# Patient Record
Sex: MALE | Race: BLACK OR AFRICAN AMERICAN | NOT HISPANIC OR LATINO
[De-identification: names, ages, dates, MRNs, and addresses within clinical notes are randomized per-mention and may not be internally consistent; named-entity substitution may affect disease eponyms.]

---

## 2017-09-13 PROBLEM — Z00.00 ENCOUNTER FOR PREVENTIVE HEALTH EXAMINATION: Status: ACTIVE | Noted: 2017-09-13

## 2017-10-09 ENCOUNTER — FORM ENCOUNTER (OUTPATIENT)
Age: 67
End: 2017-10-09

## 2017-10-10 ENCOUNTER — OUTPATIENT (OUTPATIENT)
Dept: OUTPATIENT SERVICES | Facility: HOSPITAL | Age: 67
LOS: 1 days | End: 2017-10-10
Payer: MEDICARE

## 2017-10-10 ENCOUNTER — APPOINTMENT (OUTPATIENT)
Dept: ORTHOPEDIC SURGERY | Facility: CLINIC | Age: 67
End: 2017-10-10
Payer: MEDICARE

## 2017-10-10 VITALS — BODY MASS INDEX: 41.72 KG/M2 | HEIGHT: 72 IN | WEIGHT: 308 LBS

## 2017-10-10 DIAGNOSIS — M17.0 BILATERAL PRIMARY OSTEOARTHRITIS OF KNEE: ICD-10-CM

## 2017-10-10 DIAGNOSIS — Z82.49 FAMILY HISTORY OF ISCHEMIC HEART DISEASE AND OTHER DISEASES OF THE CIRCULATORY SYSTEM: ICD-10-CM

## 2017-10-10 DIAGNOSIS — I87.2 VENOUS INSUFFICIENCY (CHRONIC) (PERIPHERAL): ICD-10-CM

## 2017-10-10 DIAGNOSIS — Z87.19 PERSONAL HISTORY OF OTHER DISEASES OF THE DIGESTIVE SYSTEM: ICD-10-CM

## 2017-10-10 DIAGNOSIS — Z87.09 PERSONAL HISTORY OF OTHER DISEASES OF THE RESPIRATORY SYSTEM: ICD-10-CM

## 2017-10-10 DIAGNOSIS — Z86.79 PERSONAL HISTORY OF OTHER DISEASES OF THE CIRCULATORY SYSTEM: ICD-10-CM

## 2017-10-10 DIAGNOSIS — Z87.898 PERSONAL HISTORY OF OTHER SPECIFIED CONDITIONS: ICD-10-CM

## 2017-10-10 DIAGNOSIS — Z86.39 PERSONAL HISTORY OF OTHER ENDOCRINE, NUTRITIONAL AND METABOLIC DISEASE: ICD-10-CM

## 2017-10-10 DIAGNOSIS — Z87.891 PERSONAL HISTORY OF NICOTINE DEPENDENCE: ICD-10-CM

## 2017-10-10 DIAGNOSIS — Z86.2 PERSONAL HISTORY OF DISEASES OF THE BLOOD AND BLOOD-FORMING ORGANS AND CERTAIN DISORDERS INVOLVING THE IMMUNE MECHANISM: ICD-10-CM

## 2017-10-10 PROCEDURE — 72170 X-RAY EXAM OF PELVIS: CPT

## 2017-10-10 PROCEDURE — 73564 X-RAY EXAM KNEE 4 OR MORE: CPT | Mod: 26,50

## 2017-10-10 PROCEDURE — 99203 OFFICE O/P NEW LOW 30 MIN: CPT

## 2017-10-10 PROCEDURE — 72170 X-RAY EXAM OF PELVIS: CPT | Mod: 26

## 2017-10-10 PROCEDURE — 73564 X-RAY EXAM KNEE 4 OR MORE: CPT

## 2017-10-14 PROBLEM — Z86.79 HISTORY OF HYPERTENSION: Status: RESOLVED | Noted: 2017-10-10 | Resolved: 2017-10-14

## 2017-10-14 PROBLEM — Z86.2 HISTORY OF ANEMIA: Status: RESOLVED | Noted: 2017-10-10 | Resolved: 2017-10-14

## 2017-10-14 PROBLEM — Z87.19 HISTORY OF GASTROESOPHAGEAL REFLUX (GERD): Status: RESOLVED | Noted: 2017-10-10 | Resolved: 2017-10-14

## 2017-10-14 PROBLEM — Z86.39 HISTORY OF HIGH CHOLESTEROL: Status: RESOLVED | Noted: 2017-10-10 | Resolved: 2017-10-14

## 2017-10-14 PROBLEM — Z87.19 HISTORY OF GASTROINTESTINAL HEMORRHAGE: Status: RESOLVED | Noted: 2017-10-10 | Resolved: 2017-10-14

## 2017-10-14 PROBLEM — Z87.898 HISTORY OF DRUG USE: Status: ACTIVE | Noted: 2017-10-10

## 2017-10-14 PROBLEM — Z87.19 HISTORY OF GASTRIC ULCER: Status: RESOLVED | Noted: 2017-10-10 | Resolved: 2017-10-14

## 2017-10-14 PROBLEM — Z87.09 HISTORY OF ASTHMA: Status: RESOLVED | Noted: 2017-10-10 | Resolved: 2017-10-14

## 2017-10-14 PROBLEM — Z86.79 HISTORY OF HEART FAILURE: Status: RESOLVED | Noted: 2017-10-10 | Resolved: 2017-10-14

## 2017-10-14 PROBLEM — Z82.49 FAMILY HISTORY OF CARDIAC DISORDER: Status: ACTIVE | Noted: 2017-10-10

## 2017-10-14 PROBLEM — Z87.891 FORMER SMOKER: Status: ACTIVE | Noted: 2017-10-10

## 2017-11-29 ENCOUNTER — APPOINTMENT (OUTPATIENT)
Dept: HEART AND VASCULAR | Facility: CLINIC | Age: 67
End: 2017-11-29
Payer: MEDICARE

## 2017-11-29 VITALS
HEART RATE: 78 BPM | SYSTOLIC BLOOD PRESSURE: 130 MMHG | OXYGEN SATURATION: 96 % | RESPIRATION RATE: 14 BRPM | DIASTOLIC BLOOD PRESSURE: 78 MMHG | WEIGHT: 308 LBS | BODY MASS INDEX: 41.72 KG/M2 | HEIGHT: 72 IN

## 2017-11-29 PROCEDURE — 99204 OFFICE O/P NEW MOD 45 MIN: CPT | Mod: 25

## 2017-11-29 PROCEDURE — 36415 COLL VENOUS BLD VENIPUNCTURE: CPT

## 2017-11-29 PROCEDURE — 93000 ELECTROCARDIOGRAM COMPLETE: CPT

## 2017-11-29 RX ORDER — ASPIRIN 325 MG/1
TABLET, FILM COATED ORAL
Refills: 0 | Status: DISCONTINUED | COMMUNITY
End: 2017-11-29

## 2017-11-29 RX ORDER — METFORMIN HYDROCHLORIDE 1000 MG/1
1000 TABLET, COATED ORAL TWICE DAILY
Qty: 60 | Refills: 3 | Status: ACTIVE | COMMUNITY
Start: 2017-11-29

## 2017-11-29 RX ORDER — AMMONIUM LACTATE 12 %
12 CREAM (GRAM) TOPICAL TWICE DAILY
Qty: 1 | Refills: 3 | Status: ACTIVE | COMMUNITY
Start: 2017-11-29 | End: 1900-01-01

## 2017-11-29 RX ORDER — DEXAMETHASONE 0.5 MG/.5MG
0.5 TABLET ORAL
Qty: 8 | Refills: 0 | Status: DISCONTINUED | COMMUNITY
Start: 2017-11-15 | End: 2017-11-29

## 2017-11-29 RX ORDER — METFORMIN HYDROCHLORIDE 625 MG/1
TABLET ORAL
Refills: 0 | Status: DISCONTINUED | COMMUNITY
End: 2017-11-29

## 2017-11-29 RX ORDER — METOPROLOL TARTRATE 75 MG/1
TABLET, FILM COATED ORAL
Refills: 0 | Status: DISCONTINUED | COMMUNITY
End: 2017-11-29

## 2017-11-29 RX ORDER — LISINOPRIL 30 MG/1
TABLET ORAL
Refills: 0 | Status: DISCONTINUED | COMMUNITY
End: 2017-11-29

## 2017-11-29 RX ORDER — OMEPRAZOLE 40 MG/1
40 CAPSULE, DELAYED RELEASE ORAL
Qty: 30 | Refills: 0 | Status: ACTIVE | COMMUNITY
Start: 2017-03-30

## 2017-11-29 RX ORDER — AMLODIPINE BESYLATE 5 MG/1
TABLET ORAL
Refills: 0 | Status: DISCONTINUED | COMMUNITY
End: 2017-11-29

## 2017-11-29 RX ORDER — LINAGLIPTIN 5 MG/1
5 TABLET, FILM COATED ORAL
Refills: 0 | Status: DISCONTINUED | COMMUNITY
End: 2017-11-29

## 2017-11-29 RX ORDER — CEPHALEXIN 250 MG/1
250 CAPSULE ORAL
Qty: 28 | Refills: 0 | Status: DISCONTINUED | COMMUNITY
Start: 2017-08-23 | End: 2017-11-29

## 2017-11-29 RX ORDER — FAMOTIDINE 20 MG/1
20 TABLET, FILM COATED ORAL
Qty: 60 | Refills: 0 | Status: DISCONTINUED | COMMUNITY
Start: 2017-11-15 | End: 2017-11-29

## 2017-11-29 RX ORDER — CHLORTHALIDONE 50 MG/1
50 TABLET ORAL
Qty: 30 | Refills: 0 | Status: DISCONTINUED | COMMUNITY
Start: 2017-11-14 | End: 2017-11-29

## 2017-11-29 RX ORDER — ATORVASTATIN CALCIUM 80 MG/1
TABLET, FILM COATED ORAL
Refills: 0 | Status: DISCONTINUED | COMMUNITY
End: 2017-11-29

## 2017-11-29 RX ORDER — AMLODIPINE BESYLATE 10 MG/1
10 TABLET ORAL
Qty: 90 | Refills: 0 | Status: DISCONTINUED | COMMUNITY
Start: 2017-03-30 | End: 2017-11-29

## 2017-11-29 RX ORDER — ALBUTEROL SULFATE 90 UG/1
108 (90 BASE) AEROSOL, METERED RESPIRATORY (INHALATION)
Qty: 85 | Refills: 0 | Status: ACTIVE | COMMUNITY
Start: 2016-12-22

## 2017-11-30 LAB
ALBUMIN SERPL ELPH-MCNC: 4.3 G/DL
ALP BLD-CCNC: 89 U/L
ALT SERPL-CCNC: 12 U/L
ANION GAP SERPL CALC-SCNC: 19 MMOL/L
APPEARANCE: CLEAR
AST SERPL-CCNC: 16 U/L
BACTERIA: ABNORMAL
BILIRUB SERPL-MCNC: 0.4 MG/DL
BILIRUBIN URINE: ABNORMAL
BLOOD URINE: NEGATIVE
BUN SERPL-MCNC: 21 MG/DL
CALCIUM SERPL-MCNC: 10 MG/DL
CHLORIDE SERPL-SCNC: 104 MMOL/L
CHOLEST SERPL-MCNC: 191 MG/DL
CHOLEST/HDLC SERPL: 3 RATIO
CO2 SERPL-SCNC: 23 MMOL/L
COLOR: ABNORMAL
CREAT SERPL-MCNC: 1.37 MG/DL
CREAT SPEC-SCNC: 376 MG/DL
GLUCOSE QUALITATIVE U: NEGATIVE MG/DL
GLUCOSE SERPL-MCNC: 142 MG/DL
GRANULAR CASTS: 2 /LPF
HBA1C MFR BLD HPLC: 7.8 %
HDLC SERPL-MCNC: 64 MG/DL
HYALINE CASTS: 7 /LPF
KETONES URINE: ABNORMAL
LDLC SERPL CALC-MCNC: 98 MG/DL
LEUKOCYTE ESTERASE URINE: ABNORMAL
MICROALBUMIN 24H UR DL<=1MG/L-MCNC: 10.6 MG/DL
MICROALBUMIN/CREAT 24H UR-RTO: 28 MG/G
MICROSCOPIC-UA: NORMAL
NITRITE URINE: NEGATIVE
PH URINE: 5
POTASSIUM SERPL-SCNC: 4.3 MMOL/L
PROT SERPL-MCNC: 8.2 G/DL
PROTEIN URINE: ABNORMAL MG/DL
PSA SERPL-MCNC: 0.72 NG/ML
RED BLOOD CELLS URINE: 3 /HPF
SODIUM SERPL-SCNC: 146 MMOL/L
SPECIFIC GRAVITY URINE: 1.02
SQUAMOUS EPITHELIAL CELLS: 2 /HPF
TRIGL SERPL-MCNC: 143 MG/DL
TSH SERPL-ACNC: 2.82 UIU/ML
URINE COMMENTS: NORMAL
UROBILINOGEN URINE: NEGATIVE MG/DL
WHITE BLOOD CELLS URINE: 14 /HPF

## 2017-12-13 ENCOUNTER — APPOINTMENT (OUTPATIENT)
Dept: HEART AND VASCULAR | Facility: CLINIC | Age: 67
End: 2017-12-13

## 2017-12-14 LAB
BASOPHILS # BLD AUTO: 0.02 K/UL
BASOPHILS NFR BLD AUTO: 0.4 %
EOSINOPHIL # BLD AUTO: 0.06 K/UL
EOSINOPHIL NFR BLD AUTO: 1.1
HCT VFR BLD CALC: 42.3 %
HGB BLD-MCNC: 13.1 G/DL
IMM GRANULOCYTES NFR BLD AUTO: 0 %
LYMPHOCYTES # BLD AUTO: 1.75 K/UL
LYMPHOCYTES NFR BLD AUTO: 30.8 %
MAN DIFF?: NORMAL
MCHC RBC-ENTMCNC: 31 GM/DL
MCHC RBC-ENTMCNC: 31.6 PG
MCV RBC AUTO: 102.2 FL
MONOCYTES # BLD AUTO: 0.3 K/UL
MONOCYTES NFR BLD AUTO: 5.3 %
NEUTROPHILS # BLD AUTO: 3.56 K/UL
NEUTROPHILS NFR BLD AUTO: 62.4 %
PLATELET # BLD AUTO: 227 K/UL
RBC # BLD: 4.14 M/UL
RBC # FLD: 14.4 %
WBC # FLD AUTO: 5.69 K/UL

## 2017-12-19 ENCOUNTER — APPOINTMENT (OUTPATIENT)
Dept: HEART AND VASCULAR | Facility: CLINIC | Age: 67
End: 2017-12-19
Payer: MEDICARE

## 2017-12-19 VITALS
HEART RATE: 74 BPM | OXYGEN SATURATION: 96 % | HEIGHT: 72 IN | SYSTOLIC BLOOD PRESSURE: 136 MMHG | BODY MASS INDEX: 42.26 KG/M2 | TEMPERATURE: 97.2 F | WEIGHT: 312 LBS | DIASTOLIC BLOOD PRESSURE: 84 MMHG

## 2017-12-19 DIAGNOSIS — I10 ESSENTIAL (PRIMARY) HYPERTENSION: ICD-10-CM

## 2017-12-19 DIAGNOSIS — E66.01 MORBID (SEVERE) OBESITY DUE TO EXCESS CALORIES: ICD-10-CM

## 2017-12-19 PROCEDURE — 99214 OFFICE O/P EST MOD 30 MIN: CPT

## 2018-01-12 ENCOUNTER — APPOINTMENT (OUTPATIENT)
Dept: VASCULAR SURGERY | Facility: CLINIC | Age: 68
End: 2018-01-12

## 2018-01-27 ENCOUNTER — INPATIENT (INPATIENT)
Facility: HOSPITAL | Age: 68
LOS: 2 days | Discharge: HOME | DRG: 247 | End: 2018-01-30
Attending: INTERNAL MEDICINE | Admitting: INTERNAL MEDICINE
Payer: MEDICARE

## 2018-01-27 VITALS
TEMPERATURE: 98 F | DIASTOLIC BLOOD PRESSURE: 83 MMHG | RESPIRATION RATE: 16 BRPM | SYSTOLIC BLOOD PRESSURE: 149 MMHG | OXYGEN SATURATION: 96 % | HEART RATE: 96 BPM

## 2018-01-27 DIAGNOSIS — Z90.3 ACQUIRED ABSENCE OF STOMACH [PART OF]: Chronic | ICD-10-CM

## 2018-01-27 DIAGNOSIS — I10 ESSENTIAL (PRIMARY) HYPERTENSION: ICD-10-CM

## 2018-01-27 DIAGNOSIS — Z95.5 PRESENCE OF CORONARY ANGIOPLASTY IMPLANT AND GRAFT: Chronic | ICD-10-CM

## 2018-01-27 DIAGNOSIS — E11.9 TYPE 2 DIABETES MELLITUS WITHOUT COMPLICATIONS: ICD-10-CM

## 2018-01-27 DIAGNOSIS — K21.9 GASTRO-ESOPHAGEAL REFLUX DISEASE WITHOUT ESOPHAGITIS: ICD-10-CM

## 2018-01-27 DIAGNOSIS — J45.909 UNSPECIFIED ASTHMA, UNCOMPLICATED: ICD-10-CM

## 2018-01-27 DIAGNOSIS — I20.0 UNSTABLE ANGINA: ICD-10-CM

## 2018-01-27 DIAGNOSIS — E78.5 HYPERLIPIDEMIA, UNSPECIFIED: ICD-10-CM

## 2018-01-27 LAB
ALBUMIN SERPL ELPH-MCNC: 3.4 G/DL — SIGNIFICANT CHANGE UP (ref 3.3–5)
ALP SERPL-CCNC: 76 U/L — SIGNIFICANT CHANGE UP (ref 40–120)
ALT FLD-CCNC: 14 U/L — SIGNIFICANT CHANGE UP (ref 10–45)
ANION GAP SERPL CALC-SCNC: 13 MMOL/L — SIGNIFICANT CHANGE UP (ref 5–17)
APTT BLD: 30.8 SEC — SIGNIFICANT CHANGE UP (ref 27.5–37.4)
AST SERPL-CCNC: 25 U/L — SIGNIFICANT CHANGE UP (ref 10–40)
BASOPHILS NFR BLD AUTO: 0.3 % — SIGNIFICANT CHANGE UP (ref 0–2)
BILIRUB SERPL-MCNC: 0.3 MG/DL — SIGNIFICANT CHANGE UP (ref 0.2–1.2)
BUN SERPL-MCNC: 19 MG/DL — SIGNIFICANT CHANGE UP (ref 7–23)
CALCIUM SERPL-MCNC: 8.9 MG/DL — SIGNIFICANT CHANGE UP (ref 8.4–10.5)
CHLORIDE SERPL-SCNC: 104 MMOL/L — SIGNIFICANT CHANGE UP (ref 96–108)
CO2 SERPL-SCNC: 24 MMOL/L — SIGNIFICANT CHANGE UP (ref 22–31)
CREAT SERPL-MCNC: 0.79 MG/DL — SIGNIFICANT CHANGE UP (ref 0.5–1.3)
EOSINOPHIL NFR BLD AUTO: 2.5 % — SIGNIFICANT CHANGE UP (ref 0–6)
GLUCOSE BLDC GLUCOMTR-MCNC: 134 MG/DL — HIGH (ref 70–99)
GLUCOSE SERPL-MCNC: 231 MG/DL — HIGH (ref 70–99)
HCT VFR BLD CALC: 35.9 % — LOW (ref 39–50)
HGB BLD-MCNC: 11.7 G/DL — LOW (ref 13–17)
INR BLD: 0.93 — SIGNIFICANT CHANGE UP (ref 0.88–1.16)
LYMPHOCYTES # BLD AUTO: 24.3 % — SIGNIFICANT CHANGE UP (ref 13–44)
MCHC RBC-ENTMCNC: 31.1 PG — SIGNIFICANT CHANGE UP (ref 27–34)
MCHC RBC-ENTMCNC: 32.6 G/DL — SIGNIFICANT CHANGE UP (ref 32–36)
MCV RBC AUTO: 95.5 FL — SIGNIFICANT CHANGE UP (ref 80–100)
MONOCYTES NFR BLD AUTO: 5.2 % — SIGNIFICANT CHANGE UP (ref 2–14)
NEUTROPHILS NFR BLD AUTO: 67.7 % — SIGNIFICANT CHANGE UP (ref 43–77)
NT-PROBNP SERPL-SCNC: 139 PG/ML — SIGNIFICANT CHANGE UP (ref 0–300)
PLATELET # BLD AUTO: 175 K/UL — SIGNIFICANT CHANGE UP (ref 150–400)
POTASSIUM SERPL-MCNC: 4.3 MMOL/L — SIGNIFICANT CHANGE UP (ref 3.5–5.3)
POTASSIUM SERPL-SCNC: 4.3 MMOL/L — SIGNIFICANT CHANGE UP (ref 3.5–5.3)
PROT SERPL-MCNC: 7.3 G/DL — SIGNIFICANT CHANGE UP (ref 6–8.3)
PROTHROM AB SERPL-ACNC: 10.3 SEC — SIGNIFICANT CHANGE UP (ref 9.8–12.7)
RBC # BLD: 3.76 M/UL — LOW (ref 4.2–5.8)
RBC # FLD: 13.3 % — SIGNIFICANT CHANGE UP (ref 10.3–16.9)
SODIUM SERPL-SCNC: 141 MMOL/L — SIGNIFICANT CHANGE UP (ref 135–145)
TROPONIN T SERPL-MCNC: <0.01 NG/ML — SIGNIFICANT CHANGE UP (ref 0–0.01)
WBC # BLD: 6.3 K/UL — SIGNIFICANT CHANGE UP (ref 3.8–10.5)
WBC # FLD AUTO: 6.3 K/UL — SIGNIFICANT CHANGE UP (ref 3.8–10.5)

## 2018-01-27 PROCEDURE — 71046 X-RAY EXAM CHEST 2 VIEWS: CPT | Mod: 26

## 2018-01-27 PROCEDURE — 93010 ELECTROCARDIOGRAM REPORT: CPT

## 2018-01-27 PROCEDURE — 99285 EMERGENCY DEPT VISIT HI MDM: CPT | Mod: 25

## 2018-01-27 RX ORDER — INSULIN LISPRO 100/ML
VIAL (ML) SUBCUTANEOUS
Qty: 0 | Refills: 0 | Status: DISCONTINUED | OUTPATIENT
Start: 2018-01-27 | End: 2018-01-30

## 2018-01-27 RX ORDER — DOCUSATE SODIUM 100 MG
100 CAPSULE ORAL THREE TIMES A DAY
Qty: 0 | Refills: 0 | Status: DISCONTINUED | OUTPATIENT
Start: 2018-01-27 | End: 2018-01-30

## 2018-01-27 RX ORDER — NITROGLYCERIN 6.5 MG
0.4 CAPSULE, EXTENDED RELEASE ORAL ONCE
Qty: 0 | Refills: 0 | Status: COMPLETED | OUTPATIENT
Start: 2018-01-27 | End: 2018-01-27

## 2018-01-27 RX ORDER — NIFEDIPINE 30 MG
1 TABLET, EXTENDED RELEASE 24 HR ORAL
Qty: 0 | Refills: 0 | COMMUNITY

## 2018-01-27 RX ORDER — OMEPRAZOLE 10 MG/1
1 CAPSULE, DELAYED RELEASE ORAL
Qty: 0 | Refills: 0 | COMMUNITY

## 2018-01-27 RX ORDER — ASPIRIN/CALCIUM CARB/MAGNESIUM 324 MG
325 TABLET ORAL DAILY
Qty: 0 | Refills: 0 | Status: DISCONTINUED | OUTPATIENT
Start: 2018-01-27 | End: 2018-01-28

## 2018-01-27 RX ORDER — HEPARIN SODIUM 5000 [USP'U]/ML
5000 INJECTION INTRAVENOUS; SUBCUTANEOUS ONCE
Qty: 0 | Refills: 0 | Status: COMPLETED | OUTPATIENT
Start: 2018-01-27 | End: 2018-01-27

## 2018-01-27 RX ORDER — ATORVASTATIN CALCIUM 80 MG/1
40 TABLET, FILM COATED ORAL AT BEDTIME
Qty: 0 | Refills: 0 | Status: DISCONTINUED | OUTPATIENT
Start: 2018-01-27 | End: 2018-01-30

## 2018-01-27 RX ORDER — GLUCAGON INJECTION, SOLUTION 0.5 MG/.1ML
1 INJECTION, SOLUTION SUBCUTANEOUS ONCE
Qty: 0 | Refills: 0 | Status: DISCONTINUED | OUTPATIENT
Start: 2018-01-27 | End: 2018-01-30

## 2018-01-27 RX ORDER — KETOCONAZOLE 20 MG/G
1 AEROSOL, FOAM TOPICAL DAILY
Qty: 0 | Refills: 0 | Status: DISCONTINUED | OUTPATIENT
Start: 2018-01-27 | End: 2018-01-30

## 2018-01-27 RX ORDER — INSULIN GLARGINE 100 [IU]/ML
10 INJECTION, SOLUTION SUBCUTANEOUS AT BEDTIME
Qty: 0 | Refills: 0 | Status: DISCONTINUED | OUTPATIENT
Start: 2018-01-27 | End: 2018-01-30

## 2018-01-27 RX ORDER — ACETAMINOPHEN 500 MG
650 TABLET ORAL EVERY 6 HOURS
Qty: 0 | Refills: 0 | Status: DISCONTINUED | OUTPATIENT
Start: 2018-01-27 | End: 2018-01-30

## 2018-01-27 RX ORDER — NITROGLYCERIN 6.5 MG
1 CAPSULE, EXTENDED RELEASE ORAL EVERY 6 HOURS
Qty: 0 | Refills: 0 | Status: COMPLETED | OUTPATIENT
Start: 2018-01-27 | End: 2018-01-29

## 2018-01-27 RX ORDER — KETOCONAZOLE 20 MG/G
1 AEROSOL, FOAM TOPICAL
Qty: 0 | Refills: 0 | COMMUNITY

## 2018-01-27 RX ORDER — DEXTROSE 50 % IN WATER 50 %
12.5 SYRINGE (ML) INTRAVENOUS ONCE
Qty: 0 | Refills: 0 | Status: DISCONTINUED | OUTPATIENT
Start: 2018-01-27 | End: 2018-01-30

## 2018-01-27 RX ORDER — HEPARIN SODIUM 5000 [USP'U]/ML
6000 INJECTION INTRAVENOUS; SUBCUTANEOUS EVERY 6 HOURS
Qty: 0 | Refills: 0 | Status: DISCONTINUED | OUTPATIENT
Start: 2018-01-27 | End: 2018-01-29

## 2018-01-27 RX ORDER — NIFEDIPINE 30 MG
60 TABLET, EXTENDED RELEASE 24 HR ORAL DAILY
Qty: 0 | Refills: 0 | Status: DISCONTINUED | OUTPATIENT
Start: 2018-01-27 | End: 2018-01-30

## 2018-01-27 RX ORDER — DEXTROSE 50 % IN WATER 50 %
25 SYRINGE (ML) INTRAVENOUS ONCE
Qty: 0 | Refills: 0 | Status: DISCONTINUED | OUTPATIENT
Start: 2018-01-27 | End: 2018-01-30

## 2018-01-27 RX ORDER — ALBUTEROL 90 UG/1
2 AEROSOL, METERED ORAL EVERY 6 HOURS
Qty: 0 | Refills: 0 | Status: DISCONTINUED | OUTPATIENT
Start: 2018-01-27 | End: 2018-01-30

## 2018-01-27 RX ORDER — ALBUTEROL 90 UG/1
2 AEROSOL, METERED ORAL
Qty: 0 | Refills: 0 | COMMUNITY

## 2018-01-27 RX ORDER — HEPARIN SODIUM 5000 [USP'U]/ML
INJECTION INTRAVENOUS; SUBCUTANEOUS
Qty: 25000 | Refills: 0 | Status: DISCONTINUED | OUTPATIENT
Start: 2018-01-27 | End: 2018-01-29

## 2018-01-27 RX ORDER — ASPIRIN/CALCIUM CARB/MAGNESIUM 324 MG
243 TABLET ORAL DAILY
Qty: 0 | Refills: 0 | Status: DISCONTINUED | OUTPATIENT
Start: 2018-01-27 | End: 2018-01-27

## 2018-01-27 RX ORDER — FUROSEMIDE 40 MG
40 TABLET ORAL DAILY
Qty: 0 | Refills: 0 | Status: DISCONTINUED | OUTPATIENT
Start: 2018-01-27 | End: 2018-01-30

## 2018-01-27 RX ORDER — DEXTROSE 50 % IN WATER 50 %
1 SYRINGE (ML) INTRAVENOUS ONCE
Qty: 0 | Refills: 0 | Status: DISCONTINUED | OUTPATIENT
Start: 2018-01-27 | End: 2018-01-30

## 2018-01-27 RX ORDER — CLOPIDOGREL BISULFATE 75 MG/1
600 TABLET, FILM COATED ORAL ONCE
Qty: 0 | Refills: 0 | Status: COMPLETED | OUTPATIENT
Start: 2018-01-27 | End: 2018-01-27

## 2018-01-27 RX ORDER — METOPROLOL TARTRATE 50 MG
100 TABLET ORAL DAILY
Qty: 0 | Refills: 0 | Status: DISCONTINUED | OUTPATIENT
Start: 2018-01-27 | End: 2018-01-30

## 2018-01-27 RX ORDER — INSULIN LISPRO 100/ML
VIAL (ML) SUBCUTANEOUS AT BEDTIME
Qty: 0 | Refills: 0 | Status: DISCONTINUED | OUTPATIENT
Start: 2018-01-27 | End: 2018-01-30

## 2018-01-27 RX ORDER — PANTOPRAZOLE SODIUM 20 MG/1
40 TABLET, DELAYED RELEASE ORAL
Qty: 0 | Refills: 0 | Status: DISCONTINUED | OUTPATIENT
Start: 2018-01-28 | End: 2018-01-30

## 2018-01-27 RX ORDER — SODIUM CHLORIDE 9 MG/ML
1000 INJECTION, SOLUTION INTRAVENOUS
Qty: 0 | Refills: 0 | Status: DISCONTINUED | OUTPATIENT
Start: 2018-01-27 | End: 2018-01-30

## 2018-01-27 RX ORDER — CLOPIDOGREL BISULFATE 75 MG/1
75 TABLET, FILM COATED ORAL DAILY
Qty: 0 | Refills: 0 | Status: DISCONTINUED | OUTPATIENT
Start: 2018-01-28 | End: 2018-01-30

## 2018-01-27 RX ADMIN — CLOPIDOGREL BISULFATE 600 MILLIGRAM(S): 75 TABLET, FILM COATED ORAL at 23:10

## 2018-01-27 RX ADMIN — HEPARIN SODIUM 5000 UNIT(S): 5000 INJECTION INTRAVENOUS; SUBCUTANEOUS at 23:11

## 2018-01-27 RX ADMIN — HEPARIN SODIUM 1000 UNIT(S)/HR: 5000 INJECTION INTRAVENOUS; SUBCUTANEOUS at 23:21

## 2018-01-27 RX ADMIN — Medication 1 INCH(S): at 23:15

## 2018-01-27 RX ADMIN — Medication 100 MILLIGRAM(S): at 23:17

## 2018-01-27 RX ADMIN — Medication 243 MILLIGRAM(S): at 18:49

## 2018-01-27 RX ADMIN — Medication 0.4 MILLIGRAM(S): at 19:19

## 2018-01-27 NOTE — H&P ADULT - PROBLEM SELECTOR PLAN 2
Continue toprol and nifedipine. Continue toprol and nifedipine.  Chlorthalidone NF, change to lasix 40mg po daily for 3+ LE pitting edema.

## 2018-01-27 NOTE — H&P ADULT - NSHPSOCIALHISTORY_GEN_ALL_CORE
Disabled  Former smoker (V15.82) (Z87.891)  History of alcohol use ??  History of drug use (305.93) (Z87.898)  Lives with significant other  On permanent disability Disabled  Former smoker (V15.82) (Z87.891) 1/2 ppd x20 yrs, d/c 20 yrs ago  History of alcohol use 0-3 drinks/week  History of drug use (305.93) (Z87.898) +weekly marijuana smoker  On permanent disability

## 2018-01-27 NOTE — ED ADULT NURSE NOTE - CHPI ED SYMPTOMS NEG
no back pain/no cough/no nausea/no syncope/no diaphoresis/no dizziness/no fever/no shortness of breath/no vomiting/no chills

## 2018-01-27 NOTE — ED ADULT NURSE NOTE - PMH
Atherosclerosis of coronary artery  s/p stents x3  Benign prostatic hypertrophy without lower urinary tract symptoms  Benign prostate hyperplasia  Cerebral artery occlusion with cerebral infarction  CVA (cerebral vascular accident)  Essential hypertension  Hypertension  Hepatitis C virus infection  Hepatitis C  Obesity  Obesity  Peptic ulcer with hemorrhage  UGIB (9/2013 at [facility] requiring 3U pRBC)  Type 2 diabetes mellitus  Diabetes

## 2018-01-27 NOTE — H&P ADULT - PROBLEM SELECTOR PLAN 4
Hold oral meds, start ISS. check A1c with aml. Hold oral meds, start ISS with lantus 10units qhs. check A1c with next lab draw.

## 2018-01-27 NOTE — H&P ADULT - ASSESSMENT
66yo M with multiple risk factors, known CAD s/p prior MI and PCI presents with worsening anginal symptoms unrelated to activity.  Admit for ACS evaluation and management, will start heparin gtt, anti-anginal tx as tolerated, ASA, plavix and plan for cath on 1/29.

## 2018-01-27 NOTE — ED ADULT NURSE NOTE - OBJECTIVE STATEMENT
Pt w/ PMH of CAD w/ x3 stents in place BIBA c/o 6/10 midsternal CP x2 hours that is intermittent.  Pt states it is similar to pain he has had on previous coronary events, but denies SOB, dizziness, N/V or diaphoresis.  Pt states he took 81mg of chewable aspirin while awaiting EMS.  Pt is able to ambulate w/o dyspnea.  Pt denies fever/chills, cough or LOC.  Pt is on [facility] pending lab results.

## 2018-01-27 NOTE — ED PROVIDER NOTE - PHYSICAL EXAMINATION
CONSTITUTIONAL: Well-appearing; well-nourished; in no apparent distress.   HEAD: Normocephalic; atraumatic.   EYES:  conjunctiva and sclera clear  ENT: normal nose; no rhinorrhea; normal pharynx with no erythema or lesions.   NECK: Supple; non-tender;   CARDIOVASCULAR: Normal S1, S2; no murmurs, rubs, or gallops. Regular rate and rhythm.   RESPIRATORY: Breathing easily; breath sounds clear and equal bilaterally; no wheezes, rhonchi, or rales.  GI: Soft; non-distended; non-tender; no palpable organomegaly.   EXT: No cyanosis, +edema pitting bilaterally no calf tenderness or calf tenderness; N/V intact  SKIN: Normal for age and race; warm; dry; good turgor; no apparent lesions or rash.   NEURO: A & O x 3; face symmetric; grossly unremarkable.   PSYCHOLOGICAL: The patient’s mood and manner are appropriate.

## 2018-01-27 NOTE — H&P ADULT - HISTORY OF PRESENT ILLNESS
This is a 66yo M former smoker, h/o HTN, HLD, morbid obesity s/p sleeve gastrectomy, DM2, asthma, HCV, h/o PUD/GIB, MIRNA, and CAD s/p NSTEMI 2009 and 2013 treated with PCI, ? h/o CHF with bilat LE CVS changes, not currently on diuretic. Pt presents with several days of severe chest pain radiating to neck, shoulder and back without aggravating or alleviating factors. Pt reports chronic stable angina since last MI, reports current symptoms are more severe than typical symptoms and are occurring without provocation.  He admits to nausea, GONZALEZ and chronic lower extremity edema; denies SOB, palpitations, diaphoresis, PND, orthopnea,near/syncope, recent illness or travel.    In the ED, EKG reveals anterolateral Qwaves with diffuse Twave flattening, no ST changes. First troponin negative, CXR without I/E, .  He was given 243mg of ASA and SL NTG x 1.  He is currently asymptomatic, admitted for ACS evaluation and management.    Cath @ [facility] 1/2/13: LM NL. mLAD 30 ISR. dLAD 50. LCX OM1 60. oOM2 80. Randa 70. mRCA 80. EF 60%. NL LV wall motion. NL EDP. No AS or MR. meg SASHA ostial OM2 after IVUS. This is a 68yo M marijuana smoker, h/o HTN, HLD, morbid obesity s/p sleeve gastrectomy, DM2, asthma, HCV, h/o PUD/GIB (approx. 4 years ago), MIRNA, and CAD s/p NSTEMI 2009 and 2013 treated with PCI, h/o CHF with bilat LE CVS changes (previously on lasix). Pt presents with one day of severe, sharp, left-sided chest pain radiating to neck, shoulder and back/between shoulder blades without aggravating or alleviating factors. Pt reports chronic stable angina since last MI, reports current symptoms are more severe than typical symptoms and are occurring without provocation.  He admits to nausea, GONZALEZ and chronic lower extremity edema; denies SOB, palpitations, diaphoresis, PND, orthopnea,near/syncope or travel.  He reports a recent URI with cough and congestion, no fever, about 5 days ago, self-resolved.  He states last stress test in 2015 and last echo in October.  He is currently planning for bilateral knee replacement secondary to OA in the near future.    In the ED, EKG reveals anterolateral Qwaves with diffuse Twave flattening, no ST changes. First troponin negative, CXR without I/E, .  He was given 243mg of ASA and SL NTG x 1.  He is currently asymptomatic, admitted for ACS evaluation and management.    Cath @ [facility] 1/2/13: LM NL. mLAD 30 ISR. dLAD 50. LCX OM1 60. oOM2 80. Randa 70. mRCA 80. EF 60%. NL LV wall motion. NL EDP. No AS or MR. meg SASHA ostial OM2 after IVUS.

## 2018-01-27 NOTE — H&P ADULT - NSHPLABSRESULTS_GEN_ALL_CORE
PT/INR - ( 27 Jan 2018 18:28 )   PT: 10.3 sec;   INR: 0.93     PTT - ( 27 Jan 2018 18:28 )  PTT:30.8 sec  LIVER FUNCTIONS - ( 27 Jan 2018 18:27 )  Alb: 3.4 g/dL / Pro: 7.3 g/dL / ALK PHOS: 76 U/L / ALT: 14 U/L / AST: 25 U/L / GGT: x           CBC Full  -  ( 27 Jan 2018 18:27 )  WBC Count : 6.3 K/uL  Hemoglobin : 11.7 g/dL  Hematocrit : 35.9 %  Platelet Count - Automated : 175 K/uL  Mean Cell Volume : 95.5 fL  Mean Cell Hemoglobin : 31.1 pg  Mean Cell Hemoglobin Concentration : 32.6 g/dL  Auto Neutrophil % : 67.7 %  Auto Lymphocyte % : 24.3 %  Auto Monocyte % : 5.2 %  Auto Eosinophil % : 2.5 %  Auto Basophil % : 0.3 %    CARDIAC MARKERS ( 27 Jan 2018 18:27 )  x     / <0.01 ng/mL / x     / x     / x          27 Jan 2018 18:39    x      |  x      |  x      ----------------------------<  x      3.7     |  x      |  x        Ca    8.9        27 Jan 2018 18:27    TPro  7.3    /  Alb  3.4    /  TBili  0.3    /  DBili  x      /  AST  25     /  ALT  14     /  AlkPhos  76     27 Jan 2018 18:27

## 2018-01-27 NOTE — H&P ADULT - PROBLEM SELECTOR PLAN 1
Admit for tele monitoring, serial enzymes.  Start heparin, nitrates, asa, plavix. Plan for echo and cath on 1/29.  Continue BB and statin.

## 2018-01-27 NOTE — H&P ADULT - PMH
Asthma    Atherosclerosis of coronary artery  s/p stents x3  Benign prostatic hypertrophy without lower urinary tract symptoms  Benign prostate hyperplasia  Cerebral artery occlusion with cerebral infarction  CVA (cerebral vascular accident)  Chest pain    Dyslipidemia    Essential hypertension  Hypertension  Gastroesophageal reflux disease, esophagitis presence not specified    Hepatitis C virus infection  Hepatitis C  NSTEMI (non-ST elevated myocardial infarction)    Obesity  Obesity  Osteoarthritis, knee  bilat  Peptic ulcer with hemorrhage  UGIB (9/2013 at [facility] requiring 3U pRBC)  Stented coronary artery    Type 2 diabetes mellitus  Diabetes

## 2018-01-27 NOTE — ED PROVIDER NOTE - MEDICAL DECISION MAKING DETAILS
here w/ symptoms concerning for unstable angina. full dose asa given (pt already took 81 himself), and will try sublingual nitro since pt had 2 episodes of pain in the ED. Symptoms by H&P not consistent w/ PE, dissection, or CHF exacerbation, so not further pursued. d/w dr coronado who requests admission to dr conklin.

## 2018-01-27 NOTE — ED PROVIDER NOTE - PMH
Atherosclerosis of coronary artery  s/p stents x3  Benign prostatic hypertrophy without lower urinary tract symptoms  Benign prostate hyperplasia  Cerebral artery occlusion with cerebral infarction  CVA (cerebral vascular accident)  Essential hypertension  Hypertension  Hepatitis C virus infection  Hepatitis C  Obesity  Obesity  Peptic ulcer with hemorrhage  UGIB (9/2013 at [facility] requiring 3U pRBC)  Type 2 diabetes mellitus  Diabetes

## 2018-01-28 DIAGNOSIS — I87.2 VENOUS INSUFFICIENCY (CHRONIC) (PERIPHERAL): ICD-10-CM

## 2018-01-28 LAB
ANION GAP SERPL CALC-SCNC: 12 MMOL/L — SIGNIFICANT CHANGE UP (ref 5–17)
APTT BLD: 34.2 SEC — SIGNIFICANT CHANGE UP (ref 27.5–37.4)
APTT BLD: 49 SEC — HIGH (ref 27.5–37.4)
APTT BLD: 50.2 SEC — HIGH (ref 27.5–37.4)
BUN SERPL-MCNC: 14 MG/DL — SIGNIFICANT CHANGE UP (ref 7–23)
CALCIUM SERPL-MCNC: 8.8 MG/DL — SIGNIFICANT CHANGE UP (ref 8.4–10.5)
CHLORIDE SERPL-SCNC: 103 MMOL/L — SIGNIFICANT CHANGE UP (ref 96–108)
CHOLEST SERPL-MCNC: 132 MG/DL — SIGNIFICANT CHANGE UP (ref 10–199)
CO2 SERPL-SCNC: 26 MMOL/L — SIGNIFICANT CHANGE UP (ref 22–31)
CREAT SERPL-MCNC: 0.67 MG/DL — SIGNIFICANT CHANGE UP (ref 0.5–1.3)
GLUCOSE BLDC GLUCOMTR-MCNC: 151 MG/DL — HIGH (ref 70–99)
GLUCOSE BLDC GLUCOMTR-MCNC: 165 MG/DL — HIGH (ref 70–99)
GLUCOSE BLDC GLUCOMTR-MCNC: 183 MG/DL — HIGH (ref 70–99)
GLUCOSE BLDC GLUCOMTR-MCNC: 200 MG/DL — HIGH (ref 70–99)
GLUCOSE SERPL-MCNC: 153 MG/DL — HIGH (ref 70–99)
HBA1C BLD-MCNC: 7.4 % — HIGH (ref 4–5.6)
HCT VFR BLD CALC: 32.7 % — LOW (ref 39–50)
HDLC SERPL-MCNC: 56 MG/DL — SIGNIFICANT CHANGE UP (ref 40–125)
HGB BLD-MCNC: 10.5 G/DL — LOW (ref 13–17)
LIPID PNL WITH DIRECT LDL SERPL: 62 MG/DL — SIGNIFICANT CHANGE UP
MAGNESIUM SERPL-MCNC: 1.6 MG/DL — SIGNIFICANT CHANGE UP (ref 1.6–2.6)
MAGNESIUM SERPL-MCNC: 1.7 MG/DL — SIGNIFICANT CHANGE UP (ref 1.6–2.6)
MCHC RBC-ENTMCNC: 30.7 PG — SIGNIFICANT CHANGE UP (ref 27–34)
MCHC RBC-ENTMCNC: 32.1 G/DL — SIGNIFICANT CHANGE UP (ref 32–36)
MCV RBC AUTO: 95.6 FL — SIGNIFICANT CHANGE UP (ref 80–100)
PHOSPHATE SERPL-MCNC: 2.8 MG/DL — SIGNIFICANT CHANGE UP (ref 2.5–4.5)
PLATELET # BLD AUTO: 179 K/UL — SIGNIFICANT CHANGE UP (ref 150–400)
POTASSIUM SERPL-MCNC: 3.5 MMOL/L — SIGNIFICANT CHANGE UP (ref 3.5–5.3)
POTASSIUM SERPL-SCNC: 3.5 MMOL/L — SIGNIFICANT CHANGE UP (ref 3.5–5.3)
RBC # BLD: 3.42 M/UL — LOW (ref 4.2–5.8)
RBC # FLD: 13.1 % — SIGNIFICANT CHANGE UP (ref 10.3–16.9)
SODIUM SERPL-SCNC: 141 MMOL/L — SIGNIFICANT CHANGE UP (ref 135–145)
TOTAL CHOLESTEROL/HDL RATIO MEASUREMENT: 2.4 RATIO — LOW (ref 3.4–9.6)
TRIGL SERPL-MCNC: 70 MG/DL — SIGNIFICANT CHANGE UP (ref 10–149)
TROPONIN T SERPL-MCNC: <0.01 NG/ML — SIGNIFICANT CHANGE UP (ref 0–0.01)
TROPONIN T SERPL-MCNC: <0.01 NG/ML — SIGNIFICANT CHANGE UP (ref 0–0.01)
TSH SERPL-MCNC: 2.64 UIU/ML — SIGNIFICANT CHANGE UP (ref 0.35–4.94)
WBC # BLD: 4.9 K/UL — SIGNIFICANT CHANGE UP (ref 3.8–10.5)
WBC # FLD AUTO: 4.9 K/UL — SIGNIFICANT CHANGE UP (ref 3.8–10.5)

## 2018-01-28 PROCEDURE — 99233 SBSQ HOSP IP/OBS HIGH 50: CPT

## 2018-01-28 RX ORDER — SODIUM CHLORIDE 9 MG/ML
1000 INJECTION, SOLUTION INTRAVENOUS
Qty: 0 | Refills: 0 | Status: DISCONTINUED | OUTPATIENT
Start: 2018-01-29 | End: 2018-01-29

## 2018-01-28 RX ORDER — MAGNESIUM OXIDE 400 MG ORAL TABLET 241.3 MG
400 TABLET ORAL ONCE
Qty: 0 | Refills: 0 | Status: COMPLETED | OUTPATIENT
Start: 2018-01-28 | End: 2018-01-28

## 2018-01-28 RX ORDER — ASPIRIN/CALCIUM CARB/MAGNESIUM 324 MG
81 TABLET ORAL DAILY
Qty: 0 | Refills: 0 | Status: DISCONTINUED | OUTPATIENT
Start: 2018-01-28 | End: 2018-01-28

## 2018-01-28 RX ORDER — ASPIRIN/CALCIUM CARB/MAGNESIUM 324 MG
325 TABLET ORAL ONCE
Qty: 0 | Refills: 0 | Status: COMPLETED | OUTPATIENT
Start: 2018-01-29 | End: 2018-01-29

## 2018-01-28 RX ORDER — POTASSIUM CHLORIDE 20 MEQ
20 PACKET (EA) ORAL ONCE
Qty: 0 | Refills: 0 | Status: COMPLETED | OUTPATIENT
Start: 2018-01-28 | End: 2018-01-28

## 2018-01-28 RX ORDER — ASPIRIN/CALCIUM CARB/MAGNESIUM 324 MG
81 TABLET ORAL DAILY
Qty: 0 | Refills: 0 | Status: DISCONTINUED | OUTPATIENT
Start: 2018-01-30 | End: 2018-01-30

## 2018-01-28 RX ADMIN — Medication 1: at 11:39

## 2018-01-28 RX ADMIN — HEPARIN SODIUM 1700 UNIT(S)/HR: 5000 INJECTION INTRAVENOUS; SUBCUTANEOUS at 21:30

## 2018-01-28 RX ADMIN — Medication 1 INCH(S): at 11:39

## 2018-01-28 RX ADMIN — Medication 60 MILLIGRAM(S): at 06:35

## 2018-01-28 RX ADMIN — Medication 100 MILLIGRAM(S): at 06:35

## 2018-01-28 RX ADMIN — INSULIN GLARGINE 10 UNIT(S): 100 INJECTION, SOLUTION SUBCUTANEOUS at 22:12

## 2018-01-28 RX ADMIN — Medication 0: at 21:36

## 2018-01-28 RX ADMIN — Medication 1: at 16:42

## 2018-01-28 RX ADMIN — CLOPIDOGREL BISULFATE 75 MILLIGRAM(S): 75 TABLET, FILM COATED ORAL at 11:39

## 2018-01-28 RX ADMIN — Medication 100 MILLIGRAM(S): at 06:31

## 2018-01-28 RX ADMIN — HEPARIN SODIUM 6000 UNIT(S): 5000 INJECTION INTRAVENOUS; SUBCUTANEOUS at 21:36

## 2018-01-28 RX ADMIN — Medication 81 MILLIGRAM(S): at 11:39

## 2018-01-28 RX ADMIN — Medication 1 INCH(S): at 18:39

## 2018-01-28 RX ADMIN — Medication 1 INCH(S): at 11:42

## 2018-01-28 RX ADMIN — Medication 1 INCH(S): at 06:31

## 2018-01-28 RX ADMIN — Medication 100 MILLIGRAM(S): at 15:21

## 2018-01-28 RX ADMIN — MAGNESIUM OXIDE 400 MG ORAL TABLET 400 MILLIGRAM(S): 241.3 TABLET ORAL at 06:30

## 2018-01-28 RX ADMIN — HEPARIN SODIUM 1200 UNIT(S)/HR: 5000 INJECTION INTRAVENOUS; SUBCUTANEOUS at 07:40

## 2018-01-28 RX ADMIN — Medication 100 MILLIGRAM(S): at 21:37

## 2018-01-28 RX ADMIN — Medication 1 INCH(S): at 18:37

## 2018-01-28 RX ADMIN — Medication 20 MILLIEQUIVALENT(S): at 06:31

## 2018-01-28 RX ADMIN — Medication 40 MILLIGRAM(S): at 06:31

## 2018-01-28 RX ADMIN — KETOCONAZOLE 1 APPLICATION(S): 20 AEROSOL, FOAM TOPICAL at 11:39

## 2018-01-28 RX ADMIN — PANTOPRAZOLE SODIUM 40 MILLIGRAM(S): 20 TABLET, DELAYED RELEASE ORAL at 06:31

## 2018-01-28 RX ADMIN — ATORVASTATIN CALCIUM 40 MILLIGRAM(S): 80 TABLET, FILM COATED ORAL at 21:36

## 2018-01-28 RX ADMIN — HEPARIN SODIUM 1400 UNIT(S)/HR: 5000 INJECTION INTRAVENOUS; SUBCUTANEOUS at 14:03

## 2018-01-28 NOTE — PROGRESS NOTE ADULT - ATTENDING COMMENTS
Covering for Dr. Schwarz. In short, 68 yo M h/o htn, dm, chronic lower extremity edema b/l with chronic venous stasis changes, CAD s/p PCI with cath last in 2013 with residual disease and known h/o stable angina presenting with chest pain at rest and admitted with concern for UA. VSS. Exam notable for 2+ edema lower extremity b/l (unchanged per patient), Labs notable for trop neg x3.   Plan:  - Cont ASA/Plavix, heparin gtt x48 hours. NPO PM for cath in am. Cont BB, statin. TTE in am.   - Chlorthalidone held 2/2 edema and transitioned to Lasix with good effects. Cont at this time.

## 2018-01-28 NOTE — PROGRESS NOTE ADULT - PROBLEM SELECTOR PLAN 7
- b/l 3+ edema with chronic venous stasis changes  - Switched chlorthalidone to Lasix 40 mg PO QD  - no open ulcers, continue to evaluate  - PT ordered  - Echo in AM to evaluate EF, NL by cath in 2013 60% with NL wall motion, BNP negative.    Lytes: K>4, Mg >2  GI PPX: protonix  DVT PPX: heparin gtt   DISPO: monitor for CP, ECG changes, s/p ASA/ Plavix load, consented for cath, f/u AM labs, confirm with Karishma/Juan Miguel in AM, echo tomorrow, pending clinical progression

## 2018-01-28 NOTE — PROGRESS NOTE ADULT - PROBLEM SELECTOR PLAN 1
History of CAD with residual disease  - Cath 1/2/13 w. Dr. Bullard @ [facility]: IVUS/SASHA to 80% ostial OM1, residual 70% prox ramus, 80% mRCA, 60% OM1, 50% dLAD, 30% mLAD ISR.  - troponin negative x3,   - ECG - anterolateral Q waves with diffuse T wave flattening, no acute ST changes  - monitor tele, currently CP free... continue serial ECGs  - Heparin gtt, nitroglycerin PRN, s/p  and Plavix 600 mg PO once loaded 1/27 PM, continued on ASA 81 and Plavix 75 mg PO daily ( mg PO once tomorrow AM precath)  - NPO after MN and consented for cardiac catheterization, Dr. Bullard performed previous, confirm with Bhusri in AM  - echo tomorrow, EF NL by cath in 2013 (LVEF 60%, NL wall motion, LVEDP 15mmHg)  - Continue Atorvastatin 40 mg PO daily, Toprol 100 mg PO daily, TSH WNL

## 2018-01-29 LAB
ANION GAP SERPL CALC-SCNC: 9 MMOL/L — SIGNIFICANT CHANGE UP (ref 5–17)
APTT BLD: >200 SEC — CRITICAL HIGH (ref 27.5–37.4)
BUN SERPL-MCNC: 13 MG/DL — SIGNIFICANT CHANGE UP (ref 7–23)
CALCIUM SERPL-MCNC: 9.2 MG/DL — SIGNIFICANT CHANGE UP (ref 8.4–10.5)
CHLORIDE SERPL-SCNC: 99 MMOL/L — SIGNIFICANT CHANGE UP (ref 96–108)
CO2 SERPL-SCNC: 31 MMOL/L — SIGNIFICANT CHANGE UP (ref 22–31)
CREAT SERPL-MCNC: 0.78 MG/DL — SIGNIFICANT CHANGE UP (ref 0.5–1.3)
CRP SERPL-MCNC: 1.91 MG/DL — HIGH (ref 0–0.4)
GLUCOSE BLDC GLUCOMTR-MCNC: 121 MG/DL — HIGH (ref 70–99)
GLUCOSE BLDC GLUCOMTR-MCNC: 161 MG/DL — HIGH (ref 70–99)
GLUCOSE BLDC GLUCOMTR-MCNC: 179 MG/DL — HIGH (ref 70–99)
GLUCOSE BLDC GLUCOMTR-MCNC: 185 MG/DL — HIGH (ref 70–99)
GLUCOSE SERPL-MCNC: 177 MG/DL — HIGH (ref 70–99)
HCT VFR BLD CALC: 35.5 % — LOW (ref 39–50)
HGB BLD-MCNC: 11.5 G/DL — LOW (ref 13–17)
INR BLD: 1.11 — SIGNIFICANT CHANGE UP (ref 0.88–1.16)
MAGNESIUM SERPL-MCNC: 1.6 MG/DL — SIGNIFICANT CHANGE UP (ref 1.6–2.6)
MCHC RBC-ENTMCNC: 30.9 PG — SIGNIFICANT CHANGE UP (ref 27–34)
MCHC RBC-ENTMCNC: 32.4 G/DL — SIGNIFICANT CHANGE UP (ref 32–36)
MCV RBC AUTO: 95.4 FL — SIGNIFICANT CHANGE UP (ref 80–100)
PLATELET # BLD AUTO: 203 K/UL — SIGNIFICANT CHANGE UP (ref 150–400)
POTASSIUM SERPL-MCNC: 3.2 MMOL/L — LOW (ref 3.5–5.3)
POTASSIUM SERPL-SCNC: 3.2 MMOL/L — LOW (ref 3.5–5.3)
PROTHROM AB SERPL-ACNC: 12.4 SEC — SIGNIFICANT CHANGE UP (ref 9.8–12.7)
RBC # BLD: 3.72 M/UL — LOW (ref 4.2–5.8)
RBC # FLD: 12.8 % — SIGNIFICANT CHANGE UP (ref 10.3–16.9)
SODIUM SERPL-SCNC: 139 MMOL/L — SIGNIFICANT CHANGE UP (ref 135–145)
WBC # BLD: 5.7 K/UL — SIGNIFICANT CHANGE UP (ref 3.8–10.5)
WBC # FLD AUTO: 5.7 K/UL — SIGNIFICANT CHANGE UP (ref 3.8–10.5)

## 2018-01-29 PROCEDURE — 93306 TTE W/DOPPLER COMPLETE: CPT | Mod: 26

## 2018-01-29 PROCEDURE — 92928 PRQ TCAT PLMT NTRAC ST 1 LES: CPT | Mod: LD

## 2018-01-29 PROCEDURE — 99232 SBSQ HOSP IP/OBS MODERATE 35: CPT

## 2018-01-29 PROCEDURE — 93010 ELECTROCARDIOGRAM REPORT: CPT

## 2018-01-29 PROCEDURE — 93458 L HRT ARTERY/VENTRICLE ANGIO: CPT | Mod: 26,XU

## 2018-01-29 PROCEDURE — 93571 IV DOP VEL&/PRESS C FLO 1ST: CPT | Mod: 26,52,LD

## 2018-01-29 RX ORDER — POTASSIUM CHLORIDE 20 MEQ
20 PACKET (EA) ORAL ONCE
Qty: 0 | Refills: 0 | Status: COMPLETED | OUTPATIENT
Start: 2018-01-29 | End: 2018-01-29

## 2018-01-29 RX ORDER — MAGNESIUM SULFATE 500 MG/ML
1 VIAL (ML) INJECTION ONCE
Qty: 0 | Refills: 0 | Status: COMPLETED | OUTPATIENT
Start: 2018-01-29 | End: 2018-01-29

## 2018-01-29 RX ORDER — POTASSIUM CHLORIDE 20 MEQ
40 PACKET (EA) ORAL EVERY 4 HOURS
Qty: 0 | Refills: 0 | Status: COMPLETED | OUTPATIENT
Start: 2018-01-29 | End: 2018-01-29

## 2018-01-29 RX ORDER — SODIUM CHLORIDE 9 MG/ML
500 INJECTION, SOLUTION INTRAVENOUS
Qty: 0 | Refills: 0 | Status: DISCONTINUED | OUTPATIENT
Start: 2018-01-29 | End: 2018-01-30

## 2018-01-29 RX ADMIN — HEPARIN SODIUM 0 UNIT(S)/HR: 5000 INJECTION INTRAVENOUS; SUBCUTANEOUS at 08:22

## 2018-01-29 RX ADMIN — Medication 100 MILLIGRAM(S): at 06:09

## 2018-01-29 RX ADMIN — ATORVASTATIN CALCIUM 40 MILLIGRAM(S): 80 TABLET, FILM COATED ORAL at 22:04

## 2018-01-29 RX ADMIN — Medication 40 MILLIEQUIVALENT(S): at 08:50

## 2018-01-29 RX ADMIN — PANTOPRAZOLE SODIUM 40 MILLIGRAM(S): 20 TABLET, DELAYED RELEASE ORAL at 06:08

## 2018-01-29 RX ADMIN — SODIUM CHLORIDE 50 MILLILITER(S): 9 INJECTION, SOLUTION INTRAVENOUS at 08:23

## 2018-01-29 RX ADMIN — Medication 650 MILLIGRAM(S): at 08:00

## 2018-01-29 RX ADMIN — Medication 60 MILLIGRAM(S): at 06:08

## 2018-01-29 RX ADMIN — Medication 650 MILLIGRAM(S): at 07:03

## 2018-01-29 RX ADMIN — Medication 100 MILLIGRAM(S): at 14:00

## 2018-01-29 RX ADMIN — KETOCONAZOLE 1 APPLICATION(S): 20 AEROSOL, FOAM TOPICAL at 20:33

## 2018-01-29 RX ADMIN — Medication 1 INCH(S): at 06:04

## 2018-01-29 RX ADMIN — Medication 40 MILLIEQUIVALENT(S): at 12:26

## 2018-01-29 RX ADMIN — Medication 1 INCH(S): at 06:09

## 2018-01-29 RX ADMIN — CLOPIDOGREL BISULFATE 75 MILLIGRAM(S): 75 TABLET, FILM COATED ORAL at 06:08

## 2018-01-29 RX ADMIN — Medication 100 MILLIGRAM(S): at 22:04

## 2018-01-29 RX ADMIN — Medication 100 GRAM(S): at 12:24

## 2018-01-29 RX ADMIN — Medication 325 MILLIGRAM(S): at 06:08

## 2018-01-29 RX ADMIN — INSULIN GLARGINE 10 UNIT(S): 100 INJECTION, SOLUTION SUBCUTANEOUS at 22:05

## 2018-01-29 RX ADMIN — SODIUM CHLORIDE 40 MILLILITER(S): 9 INJECTION, SOLUTION INTRAVENOUS at 11:06

## 2018-01-29 RX ADMIN — Medication 1: at 07:04

## 2018-01-29 RX ADMIN — Medication 1 INCH(S): at 00:16

## 2018-01-29 RX ADMIN — Medication 100 MILLIGRAM(S): at 06:08

## 2018-01-29 RX ADMIN — Medication 30 MILLILITER(S): at 11:50

## 2018-01-29 RX ADMIN — Medication 20 MILLIEQUIVALENT(S): at 08:50

## 2018-01-29 RX ADMIN — Medication 1: at 12:25

## 2018-01-29 RX ADMIN — Medication 1 INCH(S): at 01:00

## 2018-01-29 NOTE — CHART NOTE - NSCHARTNOTEFT_GEN_A_CORE
Upon Nutritional Assessment by the Registered Dietitian your patient was determined to meet criteria / has evidence of the following diagnosis/diagnoses:          [ ]  Mild Protein Calorie Malnutrition        [ ]  Moderate Protein Calorie Malnutrition        [ ] Severe Protein Calorie Malnutrition        [ ] Unspecified Protein Calorie Malnutrition        [ ] Underweight / BMI <19        [X ] Morbid Obesity / BMI > 40      Findings as based on:  •  Comprehensive nutrition assessment and consultation  •  Calorie counts (nutrient intake analysis)  •  Food acceptance and intake status from observations by staff  •  Follow up  •  Patient education  •  Intervention secondary to interdisciplinary rounds  •   concerns    BMI 42.3      Treatment:    The following diet has been recommended:  Continue with current diet order; reinforce diet compliance and encourage weight loss      PROVIDER Section:     By signing this assessment you are acknowledging and agree with the diagnosis/diagnoses assigned by the Registered Dietitian    Comments:

## 2018-01-29 NOTE — PROGRESS NOTE ADULT - PROBLEM SELECTOR PLAN 6
Asthma   - No active symptoms, continue rescue inhaler PRN. On BB chronically w/o sx
Asthma   - No active symptoms, continue rescue inhaler PRN. On BB chronically w/o sx

## 2018-01-29 NOTE — PROGRESS NOTE ADULT - PROBLEM SELECTOR PLAN 1
- Cath 1/29/18 @ [facility]: received PTCA/SASHA-->mLAD< patent pLAD stent, 75% OM1 ISR, patent LPDA stent, EF: normal, EDP 12mmHg.   - Cath 1/2/13 w. Dr. Bullard @ [facility]: IVUS/SASHA to 80% ostial OM1, residual 70% prox ramus, 80% mRCA, 60% OM1, 50% dLAD, 30% mLAD ISR.  - troponin negative x3,   - ECG - anterolateral Q waves with diffuse T wave flattening, no acute ST changes  - c/w ASA 81 and Plavix 75 mg PO daily   - echo ordered for today; f/u results, EF NL by cath in 2013 (LVEF 60%, NL wall motion, LVEDP 15mmHg)  - Continue Atorvastatin 40 mg PO daily, Toprol 100 mg PO daily, TSH WNL - Cath 1/29/18 @ [facility]: received PTCA/SASHA-->mLAD< patent pLAD stent, 75% OM1 ISR, patent LPDA stent, EF: normal, EDP 12mmHg.   - Cath 1/2/13 w. Dr. Bullard @ [facility]: IVUS/SASHA to 80% ostial OM1, residual 70% prox ramus, 80% mRCA, 60% OM1, 50% dLAD, 30% mLAD ISR.  - troponin negative x3,   - ECG - anterolateral Q waves with diffuse T wave flattening, no acute ST changes  - c/w ASA 81 and Plavix 75 mg PO daily   - ECHO 1/29/18 revealed Normal LV size and wall thickness, LV wall motion is normal. LVEF is estimated to be 55-60%, moderately dilated LA, mild aortic valve thickening,  pulmonary artery systolic pressure. No aortic root dilatation.The inferiorvena cava is normal in size (<2.1 cm) with normal inspiratory collapse   (>50%) consistent with normal right atrial pressure; no pericardial effusion.    - Continue Atorvastatin 40 mg PO daily, Toprol 100 mg PO daily, TSH WNL

## 2018-01-29 NOTE — PROGRESS NOTE ADULT - SUBJECTIVE AND OBJECTIVE BOX
Interventional Cardiology PA Adult Progress Note    Subjective Assessment: Seen and examined at bedside this AM, reports he is pain free at the moment. Reports his chest pain being worse than his prior MI. Denies SOB, orthopnea, palpitations, fever, chills, abd pain, n/v/d, HA, weakness.  	  MEDICATIONS:  furosemide    Tablet 40 milliGRAM(s) Oral daily  metoprolol succinate  milliGRAM(s) Oral daily  NIFEdipine XL 60 milliGRAM(s) Oral daily  nitroglycerin    2% Ointment 1 Inch(s) Transdermal every 6 hours  ALBUTerol    90 MICROgram(s) HFA Inhaler 2 Puff(s) Inhalation every 6 hours PRN  acetaminophen   Tablet. 650 milliGRAM(s) Oral every 6 hours PRN  aluminum hydroxide/magnesium hydroxide/simethicone Suspension 30 milliLiter(s) Oral every 6 hours PRN  docusate sodium 100 milliGRAM(s) Oral three times a day  pantoprazole    Tablet 40 milliGRAM(s) Oral before breakfast  atorvastatin 40 milliGRAM(s) Oral at bedtime  dextrose 50% Injectable 12.5 Gram(s) IV Push once  dextrose 50% Injectable 25 Gram(s) IV Push once  dextrose 50% Injectable 25 Gram(s) IV Push once  dextrose Gel 1 Dose(s) Oral once PRN  glucagon  Injectable 1 milliGRAM(s) IntraMuscular once PRN  insulin glargine Injectable (LANTUS) 10 Unit(s) SubCutaneous at bedtime  insulin lispro (HumaLOG) corrective regimen sliding scale   SubCutaneous three times a day before meals  insulin lispro (HumaLOG) corrective regimen sliding scale   SubCutaneous at bedtime  aspirin  chewable 81 milliGRAM(s) Oral daily  clopidogrel Tablet 75 milliGRAM(s) Oral daily  dextrose 5%. 1000 milliLiter(s) IV Continuous <Continuous>  heparin  Infusion.  Unit(s)/Hr IV Continuous <Continuous>  heparin  Injectable 6000 Unit(s) IV Push every 6 hours PRN  ketoconazole 2% Cream 1 Application(s) Topical daily	    [PHYSICAL EXAM:  TELEMETRY:  T(C): 36.8 (01-28-18 @ 08:54), Max: 37.1 (01-27-18 @ 18:18)  HR: 90 (01-28-18 @ 11:43) (79 - 97)  BP: 134/69 (01-28-18 @ 11:43) (115/66 - 159/95)  RR: 16 (01-28-18 @ 11:43) (16 - 18)  SpO2: 98% (01-28-18 @ 11:43) (96% - 98%)  Wt(kg): --  I&O's Summary    27 Jan 2018 07:01  -  28 Jan 2018 07:00  --------------------------------------------------------  IN: 82 mL / OUT: 0 mL / NET: 82 mL    28 Jan 2018 07:01  -  28 Jan 2018 13:33  --------------------------------------------------------  IN: 120 mL / OUT: 2340 mL / NET: -2220 mL      Height (cm): 182.9 (01-27 @ 22:17)  Weight (kg): 141.4 (01-27 @ 22:17)  BMI (kg/m2): 42.3 (01-27 @ 22:17)  BSA (m2): 2.57 (01-27 @ 22:17)  Martinez:  Central/PICC/Mid Line:                                         Appearance: Normal	  HEENT:   Normal oral mucosa, PERRL, EOMI	  Neck: Supple, - JVD; Carotid Bruit   Cardiovascular: Normal S1 S2, No JVD, No murmurs  Respiratory: Decreased Breath Sounds/No Rales, Rhonchi, Wheezing	  Gastrointestinal:  obese, Soft, Non-tender  Skin: No rashes, No ecchymoses, No cyanosis  Extremities: chronic venous stasis changes b/l, extensive chronic appearing lymphedema with scaling b/l, no open ulcers  Vascular: DP pulses palpable 2+ bilaterally, rad 2+ b/l, fem unable to palpate 2/2 habitus  Neurologic: Non-focal  Psychiatry: A & O x 3, Mood & affect appropriate      	    ECG:  	  RADIOLOGY:   DIAGNOSTIC TESTING:  [ ] Echocardiogram:   [ ]  Catheterization:  [ ] Stress Test:    [ ] HERLINDA  OTHER: 	    LABS:	 	  CARDIAC MARKERS:                          10.5   4.9   )-----------( 179      ( 28 Jan 2018 06:31 )             32.7     01-28    141  |  103  |  14  ----------------------------<  153<H>  3.5   |  26  |  0.67    Ca    8.8      28 Jan 2018 06:31  Phos  2.8     01-27  Mg     1.6     01-28    TPro  7.3  /  Alb  3.4  /  TBili  0.3  /  DBili  x   /  AST  25  /  ALT  14  /  AlkPhos  76  01-27    proBNP: Serum Pro-Brain Natriuretic Peptide: 139 pg/mL (01-27 @ 18:27)  Lipid Profile:   HgA1c: Hemoglobin A1C, Whole Blood: 7.4 % (01-27 @ 23:33)  TSH: Thyroid Stimulating Hormone, Serum: 2.636 uIU/mL (01-27 @ 23:31)  PT/INR - ( 27 Jan 2018 18:28 )   PT: 10.3 sec;   INR: 0.93     PTT - ( 28 Jan 2018 06:30 )  PTT:49.0 sec
Interventional Cardiology PA Adult Progress Note    Subjective Assessment: Pt. seen and examined at bedside this am. Pt. denies any CP, SOB, dizziness, palpitations, N/V/D.  	  MEDICATIONS:  furosemide    Tablet 40 milliGRAM(s) Oral daily  metoprolol succinate  milliGRAM(s) Oral daily  NIFEdipine XL 60 milliGRAM(s) Oral daily  ALBUTerol    90 MICROgram(s) HFA Inhaler 2 Puff(s) Inhalation every 6 hours PRN  acetaminophen   Tablet. 650 milliGRAM(s) Oral every 6 hours PRN  aluminum hydroxide/magnesium hydroxide/simethicone Suspension 30 milliLiter(s) Oral every 6 hours PRN  docusate sodium 100 milliGRAM(s) Oral three times a day  pantoprazole    Tablet 40 milliGRAM(s) Oral before breakfast  atorvastatin 40 milliGRAM(s) Oral at bedtime  dextrose 50% Injectable 12.5 Gram(s) IV Push once  dextrose 50% Injectable 25 Gram(s) IV Push once  dextrose 50% Injectable 25 Gram(s) IV Push once  dextrose Gel 1 Dose(s) Oral once PRN  glucagon  Injectable 1 milliGRAM(s) IntraMuscular once PRN  insulin glargine Injectable (LANTUS) 10 Unit(s) SubCutaneous at bedtime  insulin lispro (HumaLOG) corrective regimen sliding scale   SubCutaneous three times a day before meals  insulin lispro (HumaLOG) corrective regimen sliding scale   SubCutaneous at bedtime  clopidogrel Tablet 75 milliGRAM(s) Oral daily  dextrose 5%. 1000 milliLiter(s) IV Continuous <Continuous>  ketoconazole 2% Cream 1 Application(s) Topical daily  magnesium sulfate  IVPB 1 Gram(s) IV Intermittent once  potassium chloride    Tablet ER 40 milliEquivalent(s) Oral every 4 hours  sodium chloride 0.45%. 500 milliLiter(s) IV Continuous <Continuous>      	    [PHYSICAL EXAM:  TELEMETRY:  T(C): 36.2 (01-29-18 @ 08:42), Max: 36.8 (01-28-18 @ 21:26)  HR: 93 (01-29-18 @ 06:04) (83 - 93)  BP: 144/99 (01-29-18 @ 06:04) (119/65 - 144/99)  RR: 16 (01-29-18 @ 06:04) (16 - 16)  SpO2: 98% (01-29-18 @ 06:04) (97% - 98%)  Wt(kg): --  I&O's Summary    28 Jan 2018 07:01  -  29 Jan 2018 07:00  --------------------------------------------------------  IN: 1300 mL / OUT: 3540 mL / NET: -2240 mL        Martinez:  Central/PICC/Mid Line:                                         general: NAD  Neck: no JVD noted  CV: RRR, s1 and s2 positive, no murmurs noted  Pulm: CTA B/L, no W/R/R  GI: soft, NT/ND, +BS X 4  Extremities: chronic venous stasis changes b/l, extensive chronic appearing lymphedema with scaling b/l, no open ulcers  Neuro: AO X 3, no focal deficits noted    	    ECG:  	  RADIOLOGY:   DIAGNOSTIC TESTING:  [ ] Echocardiogram:  [ ]  Catheterization:  [ ] Stress Test:    [ ] HERLINDA  OTHER: 	    LABS:	 	  CARDIAC MARKERS:                                  11.5   5.7   )-----------( 203      ( 29 Jan 2018 07:39 )             35.5     01-29    139  |  99  |  13  ----------------------------<  177<H>  3.2<L>   |  31  |  0.78    Ca    9.2      29 Jan 2018 07:39  Phos  2.8     01-27  Mg     1.6     01-29    TPro  7.3  /  Alb  3.4  /  TBili  0.3  /  DBili  x   /  AST  25  /  ALT  14  /  AlkPhos  76  01-27    proBNP:   Lipid Profile:   HgA1c:   TSH:   PT/INR - ( 29 Jan 2018 07:39 )   PT: 12.4 sec;   INR: 1.11          PTT - ( 29 Jan 2018 07:39 )  PTT:>200.0 sec    ASSESSMENT/PLAN: 	        DVT ppx:  Dispo:

## 2018-01-29 NOTE — PROGRESS NOTE ADULT - PROBLEM SELECTOR PLAN 7
- b/l 3+ edema with chronic venous stasis changes  - Switched chlorthalidone to Lasix 40 mg PO QD  - no open ulcers, continue to evaluate  - PT ordered  - Echo in AM to evaluate EF, NL by cath in 2013 60% with NL wall motion, BNP negative.    Lytes: K>4, Mg >2  GI PPX: protonix  Dispo: s/p cath 1/29/18; f/u echo. - b/l 3+ edema with chronic venous stasis changes  - Switched chlorthalidone to Lasix 40 mg PO QD  - no open ulcers, continue to evaluate  - PT ordered  - Echo in AM to evaluate EF, NL by cath in 2013 60% with NL wall motion, BNP negative.    Lytes: K>4, Mg >2  GI PPX: protonix  Dispo:  f/u PT recs - b/l 3+ edema with chronic venous stasis changes  - Switched chlorthalidone to Lasix 40 mg PO QD  - no open ulcers, continue to evaluate  - PT ordered    Lytes: K>4, Mg >2  GI PPX: protonix  Dispo:  f/u PT recs

## 2018-01-29 NOTE — PROGRESS NOTE ADULT - PROBLEM SELECTOR PLAN 2
Hypertension  - Continue toprol and nifedipine.    - Chlorthalidone @ home but changed to Lasix 40mg PO daily for 3+ LE pitting edema.
Hypertension  - Continue toprol and nifedipine.    - Chlorthalidone @ home but changed to Lasix 40mg PO daily for 3+ LE pitting edema.

## 2018-01-29 NOTE — PROGRESS NOTE ADULT - ASSESSMENT
66yo M with multiple risk factors, known CAD s/p prior MI and PCI presents with worsening anginal symptoms unrelated to activity.  Admit for ACS evaluation and management, will start heparin gtt, anti-anginal tx as tolerated, ASA, plavix and plan for cath on 1/29.
66yo M with multiple risk factors, known CAD s/p prior MI and PCI presents with worsening anginal symptoms unrelated to activity.  Admit for ACS evaluation and management, will start heparin gtt, anti-anginal tx as tolerated, ASA, plavix and plan for cath on 1/29.

## 2018-01-29 NOTE — PROGRESS NOTE ADULT - PROBLEM SELECTOR PLAN 4
Diabetes  - Hold oral meds, ISS with lantus 10units qhs.   - HgbA1c 7.4
Diabetes  - Hold oral meds, ISS with lantus 10units qhs.   - HgbA1c 7.4

## 2018-01-29 NOTE — PROGRESS NOTE ADULT - PROBLEM SELECTOR PROBLEM 6
Asthma, unspecified asthma severity, unspecified whether complicated, unspecified whether persistent
Asthma, unspecified asthma severity, unspecified whether complicated, unspecified whether persistent

## 2018-01-29 NOTE — CONSULT NOTE ADULT - SUBJECTIVE AND OBJECTIVE BOX
CHIEF COMPLAINT: CAD    HISTORY OF PRESENT ILLNESS:  This is a 66yo M marijuana smoker, h/o HTN, HLD, morbid obesity s/p sleeve gastrectomy, DM2, asthma, HCV, h/o PUD/GIB (approx. 4 years ago), MIRNA, and CAD s/p NSTEMI 2009 and 2013 treated with PCI, h/o CHF with bilat LE CVS changes (previously on lasix). Pt presents with one day of severe, sharp, left-sided chest pain radiating to neck, shoulder and back/between shoulder blades without aggravating or alleviating factors. Pt reports chronic stable angina since last MI, reports current symptoms are more severe than typical symptoms and are occurring without provocation.  He admits to nausea, GONZALEZ and chronic lower extremity edema; denies SOB, palpitations, diaphoresis, PND, orthopnea,near/syncope or travel.  He reports a recent URI with cough and congestion, no fever, about 5 days ago, self-resolved.  He states last stress test in 2015 and last echo in October.  He is currently planning for bilateral knee replacement secondary to OA in the near future. In the ED, EKG reveals anterolateral Qwaves with diffuse Twave flattening, no ST changes. First troponin negative, CXR without I/E, .  He was given 243mg of ASA and SL NTG x 1.  He is currently asymptomatic, admitted for ACS evaluation and management. He is now s/p PCI PTCA/SASHA-->mLAD< patent pLAD stent, 75% OM1 ISR, patent LPDA stent, EF: normal, EDP 12mmHg. Patient was seen at the bedside to discuss prevention.     PAST MEDICAL & SURGICAL HISTORY:  Osteoarthritis, knee: bilat  Asthma  NSTEMI (non-ST elevated myocardial infarction)  Stented coronary artery  Chest pain  Dyslipidemia  Gastroesophageal reflux disease, esophagitis presence not specified  Essential hypertension: Hypertension  Type 2 diabetes mellitus: Diabetes  Peptic ulcer with hemorrhage: UGIB (9/2013 at [facility] requiring 3U pRBC)  Hepatitis C virus infection: Hepatitis C  Atherosclerosis of coronary artery: s/p stents x3  Obesity: Obesity  Benign prostatic hypertrophy without lower urinary tract symptoms: Benign prostate hyperplasia  Cerebral artery occlusion with cerebral infarction: CVA (cerebral vascular accident)  S/P gastrectomy: SLEEVE  History of coronary artery stent placement: x3    FAMILY HISTORY:   Father was diagnosed with CAD at 50    Allergies:   Bananas (Unknown)  Canteloupe (Unknown)  Demerol HCl (Flushing (Skin))  lisinopril (Angioedema)    HOME MEDICATIONS:  · 	atorvastatin 40 mg oral tablet: 1 tab(s) orally once a day, Last Dose Taken:    · 	chlorthalidone 25 mg oral tablet: 1 tab(s) orally once a day, Last Dose Taken:    · 	ketoconazole 2% topical cream: Apply topically to affected area once a day tracey GORMAN, Last Dose Taken:    · 	metFORMIN 1000 mg oral tablet: 1 tab(s) orally 2 times a day, Last Dose Taken:    · 	metoprolol succinate 100 mg oral tablet, extended release: 1 tab(s) orally once a day, Last Dose Taken:    · 	Nifedical XL 60 mg oral tablet, extended release: 1 tab(s) orally once a day  · 	omeprazole 40 mg oral delayed release capsule: 1 cap(s) orally once a day  · 	ProAir HFA 90 mcg/inh inhalation aerosol: 2 puff(s) inhaled 4 times a day, As Needed  · 	aspirin 81 mg oral tablet: 1 tab(s) orally once a day, Last Dose Taken:        PHYSICAL EXAM:  T(C): 35.8 (01-29-18 @ 12:38), Max: 36.8 (01-28-18 @ 21:26)  T(F): 96.4 (01-29-18 @ 12:38), Max: 98.3 (01-28-18 @ 21:26)  HR: 76 (01-29-18 @ 13:05) (76 - 93)  BP: 147/87 (01-29-18 @ 13:05) (119/65 - 147/87)  RR: 18 (01-29-18 @ 13:05) (16 - 18)  SpO2: 96% (01-29-18 @ 13:05) (96% - 98%)  Height (cm): 182.9 (01-27 @ 22:17)  Weight (kg): 141.4 (01-27 @ 22:17)  BMI (kg/m2): 42.3 (01-27 @ 22:17)  	  LABS:	                       11.5   5.7   )-----------( 203      ( 29 Jan 2018 07:39 )             35.5     01-29    139  |  99  |  13  ----------------------------<  177<H>  3.2<L>   |  31  |  0.78    Ca    9.2      29 Jan 2018 07:39  Phos  2.8     01-27  Mg     1.6     01-29    TPro  7.3  /  Alb  3.4  /  TBili  0.3  /  DBili  x   /  AST  25  /  ALT  14  /  AlkPhos  76  01-27    Cholesterol, Serum: 132 mg/dL (01-28 @ 06:31)  HDL Cholesterol, Serum: 56 mg/dL (01-28 @ 06:31)  Triglycerides, Serum: 70 mg/dL (01-28 @ 06:31)  Direct LDL: 62 mg/dL (01-28 @ 06:31)    C-Reactive Protein, Serum: 1.91 mg/dL (01-29 @ 07:39)    Hemoglobin A1C, Whole Blood: 7.4 % (01-27-18 @ 23:33)      10 "S" ASSESSMENT PLAN: SMOKING, SITTING, SUGAR, SALT, SOME FATS, SOCIAL, SLEEP, SIGNS, AND MEDS:  Tobacco usage: Patient states that he smokes marijuana about twice weekly.   Stress: He rates stress as 'pretty good'.   ETOH: He states that he occasionally has two glasses or wine or beer.   Caffeine: He has one coffee per day with whole milk and Splenda. He also drinks Crystal Lite during the day in addition to water.   Hormone Replacement: Denies.   Sleep Disorder: Denies history of a sleep study in the past. He does snore. States that he generally feels rested during the day.   Inflammatory Condition: Denies.   Activity Level: Sedentary.   Current Diet: Breakfast) can of tomato or vegetable soup by [employer]. Lunch) sandwich on wheat bread with turkey and sinclair, leftovers from dinner, or salad with red wine or Italian dressing. Dinner) hamburger and fries or baked chicken with vegetables, lasagna, or shrimp and salad. Desserts) cakes, cookies, potato chips, or leftovers.   Heart Failure: + heart failure. Patient states he was admitted previously for exacerbations. He is currently euvolemic.   Myopathy with Statins: Denies.   GI/ Issues: + GERD. Patient states that he had an EGD 3 years ago and follows with GI.     ASSESSMENT/RECOMMENDATIONS: 	  Summary: This is a 66yo M marijuana smoker, h/o HTN, HLD, morbid obesity s/p sleeve gastrectomy, DM2, asthma, HCV, h/o PUD/GIB (approx. 4 years ago), MIRNA, and CAD s/p NSTEMI 2009 and 2013 treated with PCI, h/o CHF with bilat LE CVS changes (previously on lasix). Pt presents with one day of severe, sharp, left-sided chest pain radiating to neck, shoulder and back/between shoulder blades without aggravating or alleviating factors. Pt reports chronic stable angina since last MI, reports current symptoms are more severe than typical symptoms and are occurring without provocation.  He admits to nausea, GONZALEZ and chronic lower extremity edema; denies SOB, palpitations, diaphoresis, PND, orthopnea,near/syncope or travel.  He reports a recent URI with cough and congestion, no fever, about 5 days ago, self-resolved.  He states last stress test in 2015 and last echo in October.  He is currently planning for bilateral knee replacement secondary to OA in the near future. In the ED, EKG reveals anterolateral Qwaves with diffuse Twave flattening, no ST changes. First troponin negative, CXR without I/E, .  He was given 243mg of ASA and SL NTG x 1.  He is currently asymptomatic, admitted for ACS evaluation and management. He is now s/p PCI PTCA/SASHA-->mLAD< patent pLAD stent, 75% OM1 ISR, patent LPDA stent, EF: normal, EDP 12mmHg. Patient was seen at the bedside to discuss prevention.     RECOMMENDATIONS:   Anti-platelet Therapy: DAPT per interventionalist recommendation with asa/Plavix.   Lipid Therapy: High dose statin therapy with Lipitor 40 mg/d would likely benefit this patient.   Beta Blocker Therapy: Continue current therapy with metoprolol per your discretion.   ACE/ARB Therapy: ARB therapy would likely benefit this diabetic patient with hypertension per your discretion.   Diet: Low-sodium, low-carbohydrate, Mediterranean diet. Written instruction on the Mediterranean diet was provided. The addition of an SGLT2 agent might benefit this patient with a history of CHF and an elevated Hgb A1c. Follow up with endocrinology is suggested.   Exercise: 30-45 minutes most days of the week once cleared to do so by their referring cardiologist. Cardiac rehab would likely benefit this patient.   Smoking: This patient is a non-smoker.   Stress Management: Instruction on mindfulness meditation was provided.   Sleep: A sleep study would likely benefit this obese patient with a history of snoring.     Thank you for the opportunity to see this patient. Please feel free to contact [facility] if there are any questions, or if you feel that your patient would benefit from continued follow-up visits with the Program.

## 2018-01-29 NOTE — PROGRESS NOTE ADULT - PROBLEM SELECTOR PLAN 3
Hyperlipidemia  - Continue Atorvastatin 40 mg PO daily
Hyperlipidemia  - Continue Atorvastatin 40 mg PO daily, consider increase  - Chol 132, TG 70, HDL 56, LDL 62

## 2018-01-29 NOTE — DIETITIAN INITIAL EVALUATION ADULT. - PROBLEM SELECTOR PLAN 2
Continue toprol and nifedipine.  Chlorthalidone NF, change to lasix 40mg po daily for 3+ LE pitting edema.

## 2018-01-29 NOTE — DIETITIAN INITIAL EVALUATION ADULT. - OTHER INFO
66 yo/male with PMHx HTN, HLD, lap sleeve gastrectomy, T2DM, PUD/GIB, CAD, CHF, admitted with chest pain. Pt is s/p Cath lab this AM. Pt seen in room, awake, alert, eating lunch. Pt endorses good appetite and intake PTA; per diet recall: tomato soup for breakfast, hamburgers, steak, chicken, sometimes vegetables. Currently w/good appetite- 75% intake observed at lunch. No N/V/C/D reported at this time. Intolerances to bananas and melon reported- will list in sunrise. No difficulty chewing or swallowing. Skin intact. DASH and Consistent Carbohydrate Diet handouts provided; pt uninterested in reviewing at this time as telephone was ringing.

## 2018-01-29 NOTE — PROGRESS NOTE ADULT - PROBLEM SELECTOR PLAN 5
GERD  - h/o PUD with GIB. Continue PPI daily. maalox prn.  - As per pt., last endoscopy and colonoscopy done one and half year ago at [facility], as per pt., normal result.
GERD  - h/o PUD with GIB. Continue PPI daily. maalox prn.

## 2018-01-29 NOTE — DIETITIAN INITIAL EVALUATION ADULT. - ENERGY NEEDS
Height 72"; #; #; 170%IBW  BMI 42.3  Ideal body weight used for calculations as pt >120% of IBW. Needs estimated 2/2 age, protein needs of 60-80 g protein/day 2/2 hx bariatric surgery

## 2018-01-29 NOTE — PROGRESS NOTE ADULT - PROBLEM SELECTOR PROBLEM 7
Chronic venous stasis dermatitis of both lower extremities
Chronic venous stasis dermatitis of both lower extremities

## 2018-01-30 ENCOUNTER — TRANSCRIPTION ENCOUNTER (OUTPATIENT)
Age: 68
End: 2018-01-30

## 2018-01-30 VITALS
HEART RATE: 71 BPM | OXYGEN SATURATION: 99 % | DIASTOLIC BLOOD PRESSURE: 85 MMHG | SYSTOLIC BLOOD PRESSURE: 152 MMHG | RESPIRATION RATE: 16 BRPM

## 2018-01-30 LAB
ANION GAP SERPL CALC-SCNC: 10 MMOL/L — SIGNIFICANT CHANGE UP (ref 5–17)
BASOPHILS NFR BLD AUTO: 0.2 % — SIGNIFICANT CHANGE UP (ref 0–2)
BUN SERPL-MCNC: 14 MG/DL — SIGNIFICANT CHANGE UP (ref 7–23)
CALCIUM SERPL-MCNC: 9.2 MG/DL — SIGNIFICANT CHANGE UP (ref 8.4–10.5)
CHLORIDE SERPL-SCNC: 102 MMOL/L — SIGNIFICANT CHANGE UP (ref 96–108)
CO2 SERPL-SCNC: 27 MMOL/L — SIGNIFICANT CHANGE UP (ref 22–31)
CREAT SERPL-MCNC: 0.83 MG/DL — SIGNIFICANT CHANGE UP (ref 0.5–1.3)
EOSINOPHIL NFR BLD AUTO: 1.6 % — SIGNIFICANT CHANGE UP (ref 0–6)
GLUCOSE BLDC GLUCOMTR-MCNC: 141 MG/DL — HIGH (ref 70–99)
GLUCOSE BLDC GLUCOMTR-MCNC: 265 MG/DL — HIGH (ref 70–99)
GLUCOSE SERPL-MCNC: 146 MG/DL — HIGH (ref 70–99)
HCT VFR BLD CALC: 36.9 % — LOW (ref 39–50)
HGB BLD-MCNC: 12 G/DL — LOW (ref 13–17)
LYMPHOCYTES # BLD AUTO: 26.1 % — SIGNIFICANT CHANGE UP (ref 13–44)
MAGNESIUM SERPL-MCNC: 1.7 MG/DL — SIGNIFICANT CHANGE UP (ref 1.6–2.6)
MCHC RBC-ENTMCNC: 30.8 PG — SIGNIFICANT CHANGE UP (ref 27–34)
MCHC RBC-ENTMCNC: 32.5 G/DL — SIGNIFICANT CHANGE UP (ref 32–36)
MCV RBC AUTO: 94.9 FL — SIGNIFICANT CHANGE UP (ref 80–100)
MONOCYTES NFR BLD AUTO: 5.9 % — SIGNIFICANT CHANGE UP (ref 2–14)
NEUTROPHILS NFR BLD AUTO: 66.2 % — SIGNIFICANT CHANGE UP (ref 43–77)
PLATELET # BLD AUTO: 229 K/UL — SIGNIFICANT CHANGE UP (ref 150–400)
POTASSIUM SERPL-MCNC: 4.1 MMOL/L — SIGNIFICANT CHANGE UP (ref 3.5–5.3)
POTASSIUM SERPL-SCNC: 4.1 MMOL/L — SIGNIFICANT CHANGE UP (ref 3.5–5.3)
RBC # BLD: 3.89 M/UL — LOW (ref 4.2–5.8)
RBC # FLD: 12.9 % — SIGNIFICANT CHANGE UP (ref 10.3–16.9)
SODIUM SERPL-SCNC: 139 MMOL/L — SIGNIFICANT CHANGE UP (ref 135–145)
WBC # BLD: 5.6 K/UL — SIGNIFICANT CHANGE UP (ref 3.8–10.5)
WBC # FLD AUTO: 5.6 K/UL — SIGNIFICANT CHANGE UP (ref 3.8–10.5)

## 2018-01-30 PROCEDURE — 86140 C-REACTIVE PROTEIN: CPT

## 2018-01-30 PROCEDURE — 83880 ASSAY OF NATRIURETIC PEPTIDE: CPT

## 2018-01-30 PROCEDURE — 83036 HEMOGLOBIN GLYCOSYLATED A1C: CPT

## 2018-01-30 PROCEDURE — 84443 ASSAY THYROID STIM HORMONE: CPT

## 2018-01-30 PROCEDURE — C1889: CPT

## 2018-01-30 PROCEDURE — 85730 THROMBOPLASTIN TIME PARTIAL: CPT

## 2018-01-30 PROCEDURE — 85610 PROTHROMBIN TIME: CPT

## 2018-01-30 PROCEDURE — 80053 COMPREHEN METABOLIC PANEL: CPT

## 2018-01-30 PROCEDURE — 93005 ELECTROCARDIOGRAM TRACING: CPT

## 2018-01-30 PROCEDURE — 85027 COMPLETE CBC AUTOMATED: CPT

## 2018-01-30 PROCEDURE — 80061 LIPID PANEL: CPT

## 2018-01-30 PROCEDURE — 93306 TTE W/DOPPLER COMPLETE: CPT

## 2018-01-30 PROCEDURE — 84100 ASSAY OF PHOSPHORUS: CPT

## 2018-01-30 PROCEDURE — 83735 ASSAY OF MAGNESIUM: CPT

## 2018-01-30 PROCEDURE — C1874: CPT

## 2018-01-30 PROCEDURE — 84132 ASSAY OF SERUM POTASSIUM: CPT

## 2018-01-30 PROCEDURE — 84484 ASSAY OF TROPONIN QUANT: CPT

## 2018-01-30 PROCEDURE — C1769: CPT

## 2018-01-30 PROCEDURE — 99238 HOSP IP/OBS DSCHRG MGMT 30/<: CPT

## 2018-01-30 PROCEDURE — 97161 PT EVAL LOW COMPLEX 20 MIN: CPT

## 2018-01-30 PROCEDURE — 71046 X-RAY EXAM CHEST 2 VIEWS: CPT

## 2018-01-30 PROCEDURE — 36415 COLL VENOUS BLD VENIPUNCTURE: CPT

## 2018-01-30 PROCEDURE — 82962 GLUCOSE BLOOD TEST: CPT

## 2018-01-30 PROCEDURE — 80048 BASIC METABOLIC PNL TOTAL CA: CPT

## 2018-01-30 PROCEDURE — C1725: CPT

## 2018-01-30 PROCEDURE — 85025 COMPLETE CBC W/AUTO DIFF WBC: CPT

## 2018-01-30 PROCEDURE — 99285 EMERGENCY DEPT VISIT HI MDM: CPT | Mod: 25

## 2018-01-30 PROCEDURE — C1887: CPT

## 2018-01-30 RX ORDER — METOPROLOL TARTRATE 50 MG
1 TABLET ORAL
Qty: 30 | Refills: 2 | OUTPATIENT
Start: 2018-01-30 | End: 2018-04-29

## 2018-01-30 RX ORDER — ASPIRIN/CALCIUM CARB/MAGNESIUM 324 MG
1 TABLET ORAL
Qty: 30 | Refills: 11 | OUTPATIENT
Start: 2018-01-30 | End: 2019-01-24

## 2018-01-30 RX ORDER — ASPIRIN/CALCIUM CARB/MAGNESIUM 324 MG
1 TABLET ORAL
Qty: 0 | Refills: 0 | COMMUNITY

## 2018-01-30 RX ORDER — METOPROLOL TARTRATE 50 MG
1 TABLET ORAL
Qty: 0 | Refills: 0 | COMMUNITY

## 2018-01-30 RX ORDER — ATORVASTATIN CALCIUM 80 MG/1
1 TABLET, FILM COATED ORAL
Qty: 0 | Refills: 0 | COMMUNITY

## 2018-01-30 RX ORDER — ATORVASTATIN CALCIUM 80 MG/1
1 TABLET, FILM COATED ORAL
Qty: 30 | Refills: 2 | OUTPATIENT
Start: 2018-01-30 | End: 2018-04-29

## 2018-01-30 RX ORDER — MAGNESIUM SULFATE 500 MG/ML
1 VIAL (ML) INJECTION ONCE
Qty: 0 | Refills: 0 | Status: COMPLETED | OUTPATIENT
Start: 2018-01-30 | End: 2018-01-30

## 2018-01-30 RX ORDER — CLOPIDOGREL BISULFATE 75 MG/1
1 TABLET, FILM COATED ORAL
Qty: 30 | Refills: 11 | OUTPATIENT
Start: 2018-01-30 | End: 2019-01-24

## 2018-01-30 RX ORDER — FUROSEMIDE 40 MG
1 TABLET ORAL
Qty: 30 | Refills: 2 | OUTPATIENT
Start: 2018-01-30 | End: 2018-04-29

## 2018-01-30 RX ORDER — CHLORTHALIDONE 50 MG
1 TABLET ORAL
Qty: 0 | Refills: 0 | COMMUNITY

## 2018-01-30 RX ORDER — METFORMIN HYDROCHLORIDE 850 MG/1
1 TABLET ORAL
Qty: 0 | Refills: 0 | COMMUNITY

## 2018-01-30 RX ADMIN — Medication 3: at 11:26

## 2018-01-30 RX ADMIN — CLOPIDOGREL BISULFATE 75 MILLIGRAM(S): 75 TABLET, FILM COATED ORAL at 11:26

## 2018-01-30 RX ADMIN — Medication 81 MILLIGRAM(S): at 11:26

## 2018-01-30 RX ADMIN — Medication 60 MILLIGRAM(S): at 06:09

## 2018-01-30 RX ADMIN — PANTOPRAZOLE SODIUM 40 MILLIGRAM(S): 20 TABLET, DELAYED RELEASE ORAL at 06:09

## 2018-01-30 RX ADMIN — Medication 100 MILLIGRAM(S): at 06:09

## 2018-01-30 RX ADMIN — Medication 100 GRAM(S): at 09:20

## 2018-01-30 RX ADMIN — Medication 100 MILLIGRAM(S): at 06:10

## 2018-01-30 NOTE — DISCHARGE NOTE ADULT - SECONDARY DIAGNOSIS.
Essential hypertension Dyslipidemia Type 2 diabetes mellitus Chronic venous stasis dermatitis of both lower extremities

## 2018-01-30 NOTE — DISCHARGE NOTE ADULT - PATIENT PORTAL LINK FT
“You can access the [facility] Patient Portal, offered by [facility], by registering with the following website: [email or web address]”

## 2018-01-30 NOTE — DISCHARGE NOTE ADULT - CARE PLAN
Principal Discharge DX:	Coronary artery disease  Goal:	Follow-up with cardiologist Dr. Elias in 1-2 weeks.  Assessment and plan of treatment:	You underwent a coronary angiogram and received a stent to your left anterior descending artery. You will need to schedule an additional procedure to fix an additional blockage within the  next 5 weeks. You should wait 3 days before returning to ordinary activities. Do not drive for 2 days. Consult your doctor before returning to vigorous activity. You may return to work in 3-5 days. The catheter from your wrist was removed and the dressing is now removed. You may shower, but avoid baths, hot tubs, or swimming for 5 days to prevent infection. If you notice bleeding from the site, hardening and pain at the site, drainage or redness from the site, coolness/paleness of the extremity, swelling, or fever, please call [phone]. Please continue your Aspirin and Plavix as prescribed unless otherwise indicated by your cardiologist. Stopping these medications for any reason can result in closure of your stents leading to a fatal heart attack. If you experience any chest pain, shortness of breath, or dizziness, please call your doctor and seek immediate medical attention. Please follow up with Dr. Elias in 1-2 weeks.  All of your needed prescriptions have been sent electronically to your pharmacy.  Secondary Diagnosis:	Essential hypertension  Assessment and plan of treatment:	You have a history of elevated blood pressure. Please stop taking Chlorthalidone and start taking Lasix 40 mg daily. You should continue your other blood pressure medications as prescribed.  Secondary Diagnosis:	Dyslipidemia  Assessment and plan of treatment:	Please continue Atorvastatin 40 mg daily to ensure your cardiac stent remains open and to control your cholesterol levels.  Secondary Diagnosis:	Type 2 diabetes mellitus  Goal:	Follow-up with primary care Dr. Greene in 1 week.  Assessment and plan of treatment:	You have a diagnosis of diabetes and should continue all of your diabetic medications as prescribed. Please do not take your metformin for 2 days to prevent interaction with the contrast you received and resume regular use of your metformin on Thursday, February 1, 2018. Please follow-up with a primary care doctor regarding further management of your diabetes.  Secondary Diagnosis:	Chronic venous stasis dermatitis of both lower extremities  Assessment and plan of treatment:	Please continue Lasix 40 mg daily to help decrease swelling in your legs. Apply Ketoconazole cream to your leg wounds as prescribed. Please follow-up with your primary care doctor. If you experience any worsening leg swelling or pain, please call your doctor and seek immediate medical attention.

## 2018-01-30 NOTE — DISCHARGE NOTE ADULT - CARE PROVIDER_API CALL
Jerry Elias), Cardiovascular Disease; Internal Medicine; Nuclear Cardiology  [address] [place] [address]  Phone: [phone]  Fax: [phone]    Osei Greene), Internal Medicine  [address] [address]  Phone: [phone]  Fax: [phone]

## 2018-01-30 NOTE — DISCHARGE NOTE ADULT - ADDITIONAL INSTRUCTIONS
Follow-up with cardiologist Dr. Elias in 1-2 weeks. Schedule another cardiac catheterization within the next 5 weeks.     Follow-up with primary care Dr. Greene in 1 week.

## 2018-01-30 NOTE — PHYSICAL THERAPY INITIAL EVALUATION ADULT - PERTINENT HX OF CURRENT PROBLEM, REHAB EVAL
his is a 66yo M marijuana smoker, h/o HTN, HLD, morbid obesity s/p sleeve gastrectomy, DM2, asthma, HCV, h/o PUD/GIB (approx. 4 years ago), MIRNA, and CAD s/p NSTEMI 2009 and 2013 treated with PCI, h/o CHF with bilat LE CVS changes (previously on lasix). Pt presents with one day of severe, sharp, left-sided chest pain radiating to neck, shoulder and back/between shoulder blades without aggravating or alleviating factors.

## 2018-01-30 NOTE — DISCHARGE NOTE ADULT - CARE PROVIDERS DIRECT ADDRESSES
[email or web address]

## 2018-01-30 NOTE — DISCHARGE NOTE ADULT - PLAN OF CARE
Follow-up with cardiologist Dr. Elias in 1-2 weeks. You underwent a coronary angiogram and received a stent to your left anterior descending artery. You will need to schedule an additional procedure to fix an additional blockage within the  next 5 weeks. You should wait 3 days before returning to ordinary activities. Do not drive for 2 days. Consult your doctor before returning to vigorous activity. You may return to work in 3-5 days. The catheter from your wrist was removed and the dressing is now removed. You may shower, but avoid baths, hot tubs, or swimming for 5 days to prevent infection. If you notice bleeding from the site, hardening and pain at the site, drainage or redness from the site, coolness/paleness of the extremity, swelling, or fever, please call [phone]. Please continue your Aspirin and Plavix as prescribed unless otherwise indicated by your cardiologist. Stopping these medications for any reason can result in closure of your stents leading to a fatal heart attack. If you experience any chest pain, shortness of breath, or dizziness, please call your doctor and seek immediate medical attention. Please follow up with Dr. Elias in 1-2 weeks.  All of your needed prescriptions have been sent electronically to your pharmacy. You have a history of elevated blood pressure. Please stop taking Chlorthalidone and start taking Lasix 40 mg daily. You should continue your other blood pressure medications as prescribed. Please continue Atorvastatin 40 mg daily to ensure your cardiac stent remains open and to control your cholesterol levels. Follow-up with primary care Dr. Greene in 1 week. You have a diagnosis of diabetes and should continue all of your diabetic medications as prescribed. Please do not take your metformin for 2 days to prevent interaction with the contrast you received and resume regular use of your metformin on Thursday, February 1, 2018. Please follow-up with a primary care doctor regarding further management of your diabetes. Please continue Lasix 40 mg daily to help decrease swelling in your legs. Apply Ketoconazole cream to your leg wounds as prescribed. Please follow-up with your primary care doctor. If you experience any worsening leg swelling or pain, please call your doctor and seek immediate medical attention.

## 2018-01-30 NOTE — DISCHARGE NOTE ADULT - MEDICATION SUMMARY - MEDICATIONS TO TAKE
I will START or STAY ON the medications listed below when I get home from the hospital:    aspirin 81 mg oral tablet  -- 1 tab(s) by mouth once a day  -- Indication: For Preventing your stents from closing    metFORMIN 1000 mg oral tablet  -- 1 tab(s) by mouth 2 times a day, hold for 2 days and resume regular use on Thursday, February 1, 2018  -- Indication: For Diabetes    atorvastatin 40 mg oral tablet  -- 1 tab(s) by mouth once a day  -- Indication: For Cholesterol    clopidogrel 75 mg oral tablet  -- 1 tab(s) by mouth once a day  -- Indication: For Preventing your stents from closing    metoprolol succinate 100 mg oral tablet, extended release  -- 1 tab(s) by mouth once a day  -- Indication: For Coronary artery disease    ProAir HFA 90 mcg/inh inhalation aerosol  -- 2 puff(s) inhaled 4 times a day, As Needed  -- Indication: For Asthma    Nifedical XL 60 mg oral tablet, extended release  -- 1 tab(s) by mouth once a day  -- Indication: For Hypertension    ketoconazole 2% topical cream  -- Apply on skin to affected area once a day tracey GORMAN  -- Indication: For Leg Wound    furosemide 40 mg oral tablet  -- 1 tab(s) by mouth once a day  -- Indication: For Leg Swelling    omeprazole 40 mg oral delayed release capsule  -- 1 cap(s) by mouth once a day  -- Indication: For Preventing Stomach Ulcers I will START or STAY ON the medications listed below when I get home from the hospital:    bariatric rollator  -- 1 unit(s)  once a day   -- Indication: For Rehabilitation    cardiac rehabilitation  -- 1 unit(s)  once a day   -- Indication: For Rehabilitation    aspirin 81 mg oral tablet  -- 1 tab(s) by mouth once a day  -- Indication: For Preventing your stents from closing    metFORMIN 1000 mg oral tablet  -- 1 tab(s) by mouth 2 times a day, hold for 2 days and resume regular use on Thursday, February 1, 2018  -- Indication: For Diabetes    atorvastatin 40 mg oral tablet  -- 1 tab(s) by mouth once a day  -- Indication: For Cholesterol    clopidogrel 75 mg oral tablet  -- 1 tab(s) by mouth once a day  -- Indication: For Preventing your stents from closing    metoprolol succinate 100 mg oral tablet, extended release  -- 1 tab(s) by mouth once a day  -- Indication: For Coronary artery disease    ProAir HFA 90 mcg/inh inhalation aerosol  -- 2 puff(s) inhaled 4 times a day, As Needed  -- Indication: For Asthma    Nifedical XL 60 mg oral tablet, extended release  -- 1 tab(s) by mouth once a day  -- Indication: For Hypertension    ketoconazole 2% topical cream  -- Apply on skin to affected area once a day tracey GORMAN  -- Indication: For Leg wounds    furosemide 40 mg oral tablet  -- 1 tab(s) by mouth once a day  -- Indication: For Leg swelling    omeprazole 40 mg oral delayed release capsule  -- 1 cap(s) by mouth once a day  -- Indication: For Preventing stomach ulcers

## 2018-01-30 NOTE — DISCHARGE NOTE ADULT - HOSPITAL COURSE
68 y/o M marijuana smoker, h/o HTN, HLD, morbid obesity s/p sleeve gastrectomy, DM2, asthma, HCV, h/o PUD/GIB (approx. 4 years ago), MIRNA, and CAD s/p NSTEMI 2009 and 2013 treated with PCI, h/o CHF with bilat LE chronic venous stasis changes (previously on lasix). Pt presents with one day of severe, sharp, left-sided chest pain radiating to neck, shoulder and back/between shoulder blades without aggravating or alleviating factors. Pt reports chronic stable angina since last MI, reports current symptoms are more severe than typical symptoms and are occurring without provocation.  He admits to nausea, GONZALEZ and chronic lower extremity edema; denies SOB, palpitations, diaphoresis, PND, orthopnea,near/syncope or travel.  He reports a recent URI with cough and congestion, no fever, about 5 days ago, self-resolved.  He states last stress test in 2015 and last echo in October.  He is currently planning for bilateral knee replacement secondary to OA in the near future. In the ED, EKG reveals anterolateral Qwaves with diffuse T-wave flattening, no ST changes. First troponin negative, CXR without I/E, .  He was given 243mg of ASA and SL NTG x 1.  He was admitted for ACS evaluation and management. Troponins negative x 3. The patient is now s/p cardiac cath 1/29/18 receiving PTCA/SASHA-->mLAD, patent pLAD stent, 75% OM1 ISR, patent LPDA stent, EF: normal, EDP 12mmHg, right radial access. ECHO 1/29/18 revealed Normal LV size and wall thickness, LV wall motion is normal. LVEF is estimated to be 55-60%, moderately dilated LA, mild aortic valve thickening,  pulmonary artery systolic pressure. No aortic root dilatation. Continued on Aspirin, Plavix, Atorvastatin 40 mg PO daily, Toprol 100 mg PO daily, Nifedipine daily, TSH WNL. On Chlorthalidone @ home but changed to Lasix 40mg PO daily for 3+ LE pitting edema. A1C 7.4 on arrival, patient's home oral DM meds were hel and patient was put on Lantus 10 units at bedtime with insulin sliding scale. Patient also with history of PUD with GIB. Continued on PPI daily with Maalox prn. As per pt., last endoscopy and colonoscopy done one and half year ago at [facility], as per pt., normal result. Patient had no active asthma symptoms, continue rescue inhaler PRN, has been on beta-blocker chronically without symptoms. Chronic venous stasis of B/L LE is stable without open ulcers. Physical therapy evaluated the patient and recommended home with cardiac rehab as an outpatient. Pt seen and examined at bedside, denies chest pain, shortness of breath, n/v/d, worsening LE edema, wrist pain/ hematoma. Labs showed Mg 1.7 repleted with Mag sulfate 1 gram IV x1. VSS. Patient will f/u with Dr. Elias in 1-2 weeks and schedule staged PCI in 5 weeks for residual disease. Will also schedule appointment with Dr. Greene in 1 week. Instructed to return if symptoms worsen including but not limited to chest pain, shortness of breath, wrist pain/swelling, worsening LE edema. All needed medications Rx to pharmacy and side effects explained. Pt seen and examined by attending Dr. Schwarz who agrees with above d/c plan.  V/S 	BP: 152/85		HR: 71	 	RR: 16			T: 97.5		O2: 99% RA   	  GEN: NAD  PULM:  CTA B/L  CARD: No JVD B/L, RRR, S1 and S2   ABD: +BS, NT, soft/ND	  EXT: chronic venous stasis changes b/l, extensive chronic appearing lymphedema with scaling b/l, no open ulcers  WRIST: soft, no hematoma, no bleeding  NEURO: A+Ox3, no focal deficit

## 2018-01-30 NOTE — DISCHARGE NOTE ADULT - MEDICATION SUMMARY - MEDICATIONS TO STOP TAKING
I will STOP taking the medications listed below when I get home from the hospital:    chlorthalidone 25 mg oral tablet  -- 1 tab(s) by mouth once a day

## 2018-02-02 DIAGNOSIS — I11.0 HYPERTENSIVE HEART DISEASE WITH HEART FAILURE: ICD-10-CM

## 2018-02-02 DIAGNOSIS — N40.0 BENIGN PROSTATIC HYPERPLASIA WITHOUT LOWER URINARY TRACT SYMPTOMS: ICD-10-CM

## 2018-02-02 DIAGNOSIS — Z87.891 PERSONAL HISTORY OF NICOTINE DEPENDENCE: ICD-10-CM

## 2018-02-02 DIAGNOSIS — K21.9 GASTRO-ESOPHAGEAL REFLUX DISEASE WITHOUT ESOPHAGITIS: ICD-10-CM

## 2018-02-02 DIAGNOSIS — I25.110 ATHEROSCLEROTIC HEART DISEASE OF NATIVE CORONARY ARTERY WITH UNSTABLE ANGINA PECTORIS: ICD-10-CM

## 2018-02-02 DIAGNOSIS — E78.5 HYPERLIPIDEMIA, UNSPECIFIED: ICD-10-CM

## 2018-02-02 DIAGNOSIS — J45.909 UNSPECIFIED ASTHMA, UNCOMPLICATED: ICD-10-CM

## 2018-02-02 DIAGNOSIS — E66.01 MORBID (SEVERE) OBESITY DUE TO EXCESS CALORIES: ICD-10-CM

## 2018-02-02 DIAGNOSIS — I25.2 OLD MYOCARDIAL INFARCTION: ICD-10-CM

## 2018-02-02 DIAGNOSIS — E11.9 TYPE 2 DIABETES MELLITUS WITHOUT COMPLICATIONS: ICD-10-CM

## 2018-02-02 DIAGNOSIS — I87.2 VENOUS INSUFFICIENCY (CHRONIC) (PERIPHERAL): ICD-10-CM

## 2018-02-02 DIAGNOSIS — I50.9 HEART FAILURE, UNSPECIFIED: ICD-10-CM

## 2018-02-02 DIAGNOSIS — R07.9 CHEST PAIN, UNSPECIFIED: ICD-10-CM

## 2018-02-02 DIAGNOSIS — M17.0 BILATERAL PRIMARY OSTEOARTHRITIS OF KNEE: ICD-10-CM

## 2018-02-02 DIAGNOSIS — Z86.73 PERSONAL HISTORY OF TRANSIENT ISCHEMIC ATTACK (TIA), AND CEREBRAL INFARCTION WITHOUT RESIDUAL DEFICITS: ICD-10-CM

## 2018-02-16 ENCOUNTER — APPOINTMENT (OUTPATIENT)
Dept: ENDOCRINOLOGY | Facility: CLINIC | Age: 68
End: 2018-02-16

## 2018-02-16 VITALS
BODY MASS INDEX: 44.09 KG/M2 | WEIGHT: 308 LBS | DIASTOLIC BLOOD PRESSURE: 73 MMHG | HEART RATE: 85 BPM | HEIGHT: 70 IN | SYSTOLIC BLOOD PRESSURE: 126 MMHG

## 2018-02-18 ENCOUNTER — TRANSCRIPTION ENCOUNTER (OUTPATIENT)
Age: 68
End: 2018-02-18

## 2018-02-21 ENCOUNTER — APPOINTMENT (OUTPATIENT)
Dept: HEART AND VASCULAR | Facility: CLINIC | Age: 68
End: 2018-02-21
Payer: MEDICARE

## 2018-02-21 VITALS — DIASTOLIC BLOOD PRESSURE: 79 MMHG | SYSTOLIC BLOOD PRESSURE: 149 MMHG | OXYGEN SATURATION: 92 % | HEART RATE: 82 BPM

## 2018-02-21 PROCEDURE — 99215 OFFICE O/P EST HI 40 MIN: CPT

## 2018-02-21 RX ORDER — CHLORTHALIDONE 25 MG/1
25 TABLET ORAL
Qty: 30 | Refills: 0 | Status: DISCONTINUED | COMMUNITY
Start: 2017-08-08 | End: 2018-02-21

## 2018-03-23 ENCOUNTER — MEDICATION RENEWAL (OUTPATIENT)
Age: 68
End: 2018-03-23

## 2018-04-11 ENCOUNTER — APPOINTMENT (OUTPATIENT)
Dept: HEART AND VASCULAR | Facility: CLINIC | Age: 68
End: 2018-04-11
Payer: MEDICARE

## 2018-04-11 VITALS — HEART RATE: 77 BPM | SYSTOLIC BLOOD PRESSURE: 141 MMHG | OXYGEN SATURATION: 97 % | DIASTOLIC BLOOD PRESSURE: 83 MMHG

## 2018-04-11 DIAGNOSIS — I25.10 ATHEROSCLEROTIC HEART DISEASE OF NATIVE CORONARY ARTERY W/OUT ANGINA PECTORIS: ICD-10-CM

## 2018-04-11 PROCEDURE — 99214 OFFICE O/P EST MOD 30 MIN: CPT

## 2018-04-11 RX ORDER — ATORVASTATIN CALCIUM 40 MG/1
40 TABLET, FILM COATED ORAL DAILY
Qty: 30 | Refills: 3 | Status: DISCONTINUED | COMMUNITY
Start: 2017-11-29 | End: 2018-04-11

## 2018-04-11 RX ORDER — ASPIRIN ENTERIC COATED TABLETS 81 MG 81 MG/1
81 TABLET, DELAYED RELEASE ORAL DAILY
Qty: 90 | Refills: 3 | Status: ACTIVE | COMMUNITY
Start: 2018-04-11

## 2018-04-11 RX ORDER — LOSARTAN POTASSIUM 25 MG/1
25 TABLET, FILM COATED ORAL DAILY
Qty: 30 | Refills: 3 | Status: DISCONTINUED | COMMUNITY
Start: 2018-02-21 | End: 2018-04-11

## 2018-04-11 RX ORDER — KETOCONAZOLE 20 MG/G
2 CREAM TOPICAL
Qty: 60 | Refills: 0 | Status: DISCONTINUED | COMMUNITY
Start: 2017-02-13 | End: 2018-04-11

## 2018-04-11 RX ORDER — EMPAGLIFLOZIN 10 MG/1
10 TABLET, FILM COATED ORAL DAILY
Qty: 90 | Refills: 3 | Status: ACTIVE | COMMUNITY
Start: 2018-04-11 | End: 1900-01-01

## 2018-04-11 RX ORDER — DAPAGLIFLOZIN 5 MG/1
5 TABLET, FILM COATED ORAL DAILY
Qty: 30 | Refills: 5 | Status: DISCONTINUED | COMMUNITY
Start: 2018-02-21 | End: 2018-04-11

## 2018-04-11 RX ORDER — NIFEDIPINE 60 MG/1
60 TABLET, EXTENDED RELEASE ORAL DAILY
Qty: 90 | Refills: 3 | Status: DISCONTINUED | COMMUNITY
Start: 2017-07-17 | End: 2018-04-11

## 2018-04-13 VITALS
TEMPERATURE: 97 F | SYSTOLIC BLOOD PRESSURE: 139 MMHG | OXYGEN SATURATION: 96 % | RESPIRATION RATE: 16 BRPM | HEART RATE: 80 BPM | DIASTOLIC BLOOD PRESSURE: 71 MMHG | WEIGHT: 315 LBS

## 2018-04-13 RX ORDER — CHLORHEXIDINE GLUCONATE 213 G/1000ML
1 SOLUTION TOPICAL ONCE
Qty: 0 | Refills: 0 | Status: DISCONTINUED | OUTPATIENT
Start: 2018-04-23 | End: 2018-04-24

## 2018-04-13 RX ORDER — SODIUM CHLORIDE 9 MG/ML
500 INJECTION INTRAMUSCULAR; INTRAVENOUS; SUBCUTANEOUS
Qty: 0 | Refills: 0 | Status: DISCONTINUED | OUTPATIENT
Start: 2018-04-23 | End: 2018-04-24

## 2018-04-13 NOTE — H&P ADULT - NSHPSOCIALHISTORY_GEN_ALL_CORE
Disabled  	Former smoker (V15.82) (Z87.891) 1/2 ppd x20 yrs, d/c 20 yrs ago Disabled; Former smoker 1/2 ppd x20 yrs, d/c 20 yrs ago, current marijuana use weekly, social ETOH use.

## 2018-04-13 NOTE — H&P ADULT - PMH
Asthma    Atherosclerosis of coronary artery  s/p stents x3  Benign prostatic hypertrophy without lower urinary tract symptoms  Benign prostate hyperplasia  Cerebral artery occlusion with cerebral infarction  CVA (cerebral vascular accident)  Chest pain    Dyslipidemia    Essential hypertension  Hypertension  Gastroesophageal reflux disease, esophagitis presence not specified    Hepatitis C virus infection  Hepatitis C  NSTEMI (non-ST elevated myocardial infarction)    Obesity  Obesity  Osteoarthritis, knee  bilat  Peptic ulcer with hemorrhage  UGIB (9/2013 at [facility] requiring 3U pRBC)  Stented coronary artery    Type 2 diabetes mellitus  Diabetes

## 2018-04-13 NOTE — H&P ADULT - ASSESSMENT
66 y/o male, marijuana smoker, with a PMhx of HTN, HLD, morbid obesity s/p sleeve gastrectomy, DM2, asthma, HCV, h/o PUD/GIB (approx. 4 years ago), MIRNA, chronic diastolic CHF (EF 55-60% by Echo 1/2018),  known CAD s/p prior NSTEMI 2009 and 2013, most recent cath @ 1/29/2018 --> s/p PTCA/SASHA LAD, 75% OM1 ISR, who now presents for staged PCI of residual OM1 lesion.

## 2018-04-13 NOTE — H&P ADULT - HISTORY OF PRESENT ILLNESS
SKELETON  68 y/o male, marijuana smoker, with a PMhx of HTN, HLD, morbid obesity s/p sleeve gastrectomy, DM2, asthma, HCV, h/o PUD/GIB (approx. 4 years ago), MIRNA, chronic diastolic CHF (EF 55-60% by Echo 1/2018),  known CAD s/p prior NSTEMI 2009 and 2013, most recent angiogram @ 1/29/2018 at which time pt received PTCA/SASHA LAD, patent pLAD and LPDA stent, 75% OM1 ISR presented for staged PCI of residual OM1 lesion. Patient reports since procedure (INSERT Symptoms):    Prior ECHO 1/29/18 revealed Normal LV size and wall thickness, LV wall motion is normal. LVEF is estimated to be 55-60%, moderately dilated LA, mild aortic valve thickening, pulmonary artery systolic pressure. No aortic root dilatation.   In light of patient’s risk factors, known CAD with residual OM! Stenosis and CCS Angina (INSERT SYMPTOMS), pt now presents for staged PCI.   Cath History   s/p cardiac catheterization (1/2018 @ [facility]): LM: Luminal irregularities, pLAD stent patent, mLAD 80% iFR 0.81 stenosis, OM2 75% ISR, patent stent LPDA, RCA small non dominant, LVEDP 12 mmHG. Intervention: SASHA mLAD. Staged PCI Of OM2 in 2 weeks *Patient bringing in medications, please confirm  66 y/o male, marijuana smoker, with a PMhx of HTN, HLD, morbid obesity s/p sleeve gastrectomy, DM2, asthma, HCV, h/o PUD/GIB (approx. 4 years ago), MIRNA, chronic diastolic CHF (EF 55-60% by Echo 1/2018),  known CAD s/p prior NSTEMI 2009 and 2013, most recent angiogram @ 1/29/2018 at which time pt received PTCA/SASHA LAD, patent pLAD and LPDA stent, 75% OM1 ISR presented for staged PCI of residual OM1 lesion. Patient reports since procedure he has been feeling well. He denies CP, SOB, palpitations, n/v, diaphoresis, PND/orthopnea, LE edema, or syncope.    Prior ECHO 1/29/18 revealed Normal LV size and wall thickness, LV wall motion is normal. LVEF is estimated to be 55-60%, moderately dilated LA, mild aortic valve thickening, pulmonary artery systolic pressure. No aortic root dilatation.   In light of patient’s risk factors and known CAD with residual Stenosis, pt now presents for staged PCI.   Cath History   s/p cardiac catheterization (1/2018 @ [facility]): LM: Luminal irregularities, pLAD stent patent, mLAD 80% iFR 0.81 stenosis, OM2 75% ISR, patent stent LPDA, RCA small non dominant, LVEDP 12 mmHG. Intervention: SASHA mLAD. Staged PCI Of OM2 in 2 weeks 68 y/o male, marijuana smoker, with a PMhx of HTN, HLD, morbid obesity s/p sleeve gastrectomy, DM2, asthma, HCV, h/o PUD/GIB (approx. 4 years ago), MIRNA, chronic diastolic CHF (EF 55-60% by Echo 1/2018),  known CAD s/p prior NSTEMI 2009 and 2013, most recent angiogram @ 1/29/2018 at which time pt received PTCA/SASHA LAD, patent pLAD and LPDA stent, 75% OM1 ISR presented for staged PCI of residual OM1 lesion. Patient reports since procedure he has been feeling well. He denies CP, SOB, palpitations, n/v, diaphoresis, PND/orthopnea, LE edema, or syncope.    Prior ECHO 1/29/18 revealed Normal LV size and wall thickness, LV wall motion is normal. LVEF is estimated to be 55-60%, moderately dilated LA, mild aortic valve thickening, pulmonary artery systolic pressure. No aortic root dilatation.   In light of patient’s risk factors and known CAD with residual Stenosis, pt now presents for staged PCI.   Cath History   s/p cardiac catheterization (1/2018 @ [facility]): LM: Luminal irregularities, pLAD stent patent, mLAD 80% iFR 0.81 stenosis, OM2 75% ISR, patent stent LPDA, RCA small non dominant, LVEDP 12 mmHG. Intervention: SASHA mLAD. Staged PCI Of OM2 in 2 weeks

## 2018-04-23 ENCOUNTER — INPATIENT (INPATIENT)
Facility: HOSPITAL | Age: 68
LOS: 0 days | Discharge: HOME | DRG: 247 | End: 2018-04-24
Attending: INTERNAL MEDICINE | Admitting: INTERNAL MEDICINE
Payer: MEDICARE

## 2018-04-23 DIAGNOSIS — Z90.3 ACQUIRED ABSENCE OF STOMACH [PART OF]: Chronic | ICD-10-CM

## 2018-04-23 DIAGNOSIS — Z95.5 PRESENCE OF CORONARY ANGIOPLASTY IMPLANT AND GRAFT: Chronic | ICD-10-CM

## 2018-04-23 LAB
ANION GAP SERPL CALC-SCNC: 10 MMOL/L — SIGNIFICANT CHANGE UP (ref 5–17)
APTT BLD: 34.4 SEC — SIGNIFICANT CHANGE UP (ref 27.5–37.4)
BASOPHILS NFR BLD AUTO: 0.2 % — SIGNIFICANT CHANGE UP (ref 0–2)
BUN SERPL-MCNC: 18 MG/DL — SIGNIFICANT CHANGE UP (ref 7–23)
CALCIUM SERPL-MCNC: 9.8 MG/DL — SIGNIFICANT CHANGE UP (ref 8.4–10.5)
CHLORIDE SERPL-SCNC: 99 MMOL/L — SIGNIFICANT CHANGE UP (ref 96–108)
CHOLEST SERPL-MCNC: 134 MG/DL — SIGNIFICANT CHANGE UP (ref 10–199)
CK MB CFR SERPL CALC: 1.3 NG/ML — SIGNIFICANT CHANGE UP (ref 0–6.7)
CO2 SERPL-SCNC: 29 MMOL/L — SIGNIFICANT CHANGE UP (ref 22–31)
CREAT SERPL-MCNC: 0.91 MG/DL — SIGNIFICANT CHANGE UP (ref 0.5–1.3)
CRP SERPL-MCNC: 0.74 MG/DL — HIGH (ref 0–0.4)
EOSINOPHIL NFR BLD AUTO: 0.9 % — SIGNIFICANT CHANGE UP (ref 0–6)
GLUCOSE BLDC GLUCOMTR-MCNC: 147 MG/DL — HIGH (ref 70–99)
GLUCOSE BLDC GLUCOMTR-MCNC: 79 MG/DL — SIGNIFICANT CHANGE UP (ref 70–99)
GLUCOSE SERPL-MCNC: 100 MG/DL — HIGH (ref 70–99)
HBA1C BLD-MCNC: 6.9 % — HIGH (ref 4–5.6)
HCT VFR BLD CALC: 40 % — SIGNIFICANT CHANGE UP (ref 39–50)
HDLC SERPL-MCNC: 64 MG/DL — SIGNIFICANT CHANGE UP (ref 40–125)
HGB BLD-MCNC: 13.1 G/DL — SIGNIFICANT CHANGE UP (ref 13–17)
INR BLD: 1.05 — SIGNIFICANT CHANGE UP (ref 0.88–1.16)
LIPID PNL WITH DIRECT LDL SERPL: 58 MG/DL — SIGNIFICANT CHANGE UP
LYMPHOCYTES # BLD AUTO: 32.3 % — SIGNIFICANT CHANGE UP (ref 13–44)
MCHC RBC-ENTMCNC: 30.5 PG — SIGNIFICANT CHANGE UP (ref 27–34)
MCHC RBC-ENTMCNC: 32.8 G/DL — SIGNIFICANT CHANGE UP (ref 32–36)
MCV RBC AUTO: 93.2 FL — SIGNIFICANT CHANGE UP (ref 80–100)
MONOCYTES NFR BLD AUTO: 6.1 % — SIGNIFICANT CHANGE UP (ref 2–14)
NEUTROPHILS NFR BLD AUTO: 60.5 % — SIGNIFICANT CHANGE UP (ref 43–77)
PLATELET # BLD AUTO: 214 K/UL — SIGNIFICANT CHANGE UP (ref 150–400)
POTASSIUM SERPL-MCNC: 3.9 MMOL/L — SIGNIFICANT CHANGE UP (ref 3.5–5.3)
POTASSIUM SERPL-SCNC: 3.9 MMOL/L — SIGNIFICANT CHANGE UP (ref 3.5–5.3)
PROTHROM AB SERPL-ACNC: 11.7 SEC — SIGNIFICANT CHANGE UP (ref 9.8–12.7)
RBC # BLD: 4.29 M/UL — SIGNIFICANT CHANGE UP (ref 4.2–5.8)
RBC # FLD: 13.8 % — SIGNIFICANT CHANGE UP (ref 10.3–16.9)
SODIUM SERPL-SCNC: 138 MMOL/L — SIGNIFICANT CHANGE UP (ref 135–145)
TOTAL CHOLESTEROL/HDL RATIO MEASUREMENT: 2.1 RATIO — LOW (ref 3.4–9.6)
TRIGL SERPL-MCNC: 60 MG/DL — SIGNIFICANT CHANGE UP (ref 10–149)
WBC # BLD: 5.4 K/UL — SIGNIFICANT CHANGE UP (ref 3.8–10.5)
WBC # FLD AUTO: 5.4 K/UL — SIGNIFICANT CHANGE UP (ref 3.8–10.5)

## 2018-04-23 PROCEDURE — 93010 ELECTROCARDIOGRAM REPORT: CPT

## 2018-04-23 PROCEDURE — 93454 CORONARY ARTERY ANGIO S&I: CPT | Mod: 26,XU

## 2018-04-23 PROCEDURE — 92928 PRQ TCAT PLMT NTRAC ST 1 LES: CPT | Mod: RI

## 2018-04-23 RX ORDER — KETOCONAZOLE 20 MG/G
1 AEROSOL, FOAM TOPICAL DAILY
Qty: 0 | Refills: 0 | Status: DISCONTINUED | OUTPATIENT
Start: 2018-04-23 | End: 2018-04-24

## 2018-04-23 RX ORDER — CLOPIDOGREL BISULFATE 75 MG/1
75 TABLET, FILM COATED ORAL ONCE
Qty: 0 | Refills: 0 | Status: COMPLETED | OUTPATIENT
Start: 2018-04-23 | End: 2018-04-23

## 2018-04-23 RX ORDER — DEXTROSE 50 % IN WATER 50 %
25 SYRINGE (ML) INTRAVENOUS ONCE
Qty: 0 | Refills: 0 | Status: DISCONTINUED | OUTPATIENT
Start: 2018-04-23 | End: 2018-04-24

## 2018-04-23 RX ORDER — INSULIN LISPRO 100/ML
VIAL (ML) SUBCUTANEOUS
Qty: 0 | Refills: 0 | Status: DISCONTINUED | OUTPATIENT
Start: 2018-04-23 | End: 2018-04-24

## 2018-04-23 RX ORDER — GLUCAGON INJECTION, SOLUTION 0.5 MG/.1ML
1 INJECTION, SOLUTION SUBCUTANEOUS ONCE
Qty: 0 | Refills: 0 | Status: DISCONTINUED | OUTPATIENT
Start: 2018-04-23 | End: 2018-04-24

## 2018-04-23 RX ORDER — NIFEDIPINE 30 MG
60 TABLET, EXTENDED RELEASE 24 HR ORAL DAILY
Qty: 0 | Refills: 0 | Status: DISCONTINUED | OUTPATIENT
Start: 2018-04-23 | End: 2018-04-24

## 2018-04-23 RX ORDER — METOPROLOL TARTRATE 50 MG
100 TABLET ORAL DAILY
Qty: 0 | Refills: 0 | Status: DISCONTINUED | OUTPATIENT
Start: 2018-04-23 | End: 2018-04-24

## 2018-04-23 RX ORDER — DEXTROSE 50 % IN WATER 50 %
1 SYRINGE (ML) INTRAVENOUS ONCE
Qty: 0 | Refills: 0 | Status: DISCONTINUED | OUTPATIENT
Start: 2018-04-23 | End: 2018-04-24

## 2018-04-23 RX ORDER — ALBUTEROL 90 UG/1
2 AEROSOL, METERED ORAL EVERY 4 HOURS
Qty: 0 | Refills: 0 | Status: DISCONTINUED | OUTPATIENT
Start: 2018-04-23 | End: 2018-04-24

## 2018-04-23 RX ORDER — SODIUM CHLORIDE 9 MG/ML
1000 INJECTION INTRAMUSCULAR; INTRAVENOUS; SUBCUTANEOUS
Qty: 0 | Refills: 0 | Status: DISCONTINUED | OUTPATIENT
Start: 2018-04-23 | End: 2018-04-23

## 2018-04-23 RX ORDER — DEXTROSE 50 % IN WATER 50 %
12.5 SYRINGE (ML) INTRAVENOUS ONCE
Qty: 0 | Refills: 0 | Status: DISCONTINUED | OUTPATIENT
Start: 2018-04-23 | End: 2018-04-24

## 2018-04-23 RX ORDER — ASPIRIN/CALCIUM CARB/MAGNESIUM 324 MG
81 TABLET ORAL DAILY
Qty: 0 | Refills: 0 | Status: DISCONTINUED | OUTPATIENT
Start: 2018-04-24 | End: 2018-04-24

## 2018-04-23 RX ORDER — SODIUM CHLORIDE 9 MG/ML
1000 INJECTION, SOLUTION INTRAVENOUS
Qty: 0 | Refills: 0 | Status: DISCONTINUED | OUTPATIENT
Start: 2018-04-23 | End: 2018-04-23

## 2018-04-23 RX ORDER — PANTOPRAZOLE SODIUM 20 MG/1
40 TABLET, DELAYED RELEASE ORAL
Qty: 0 | Refills: 0 | Status: DISCONTINUED | OUTPATIENT
Start: 2018-04-23 | End: 2018-04-24

## 2018-04-23 RX ORDER — ATORVASTATIN CALCIUM 80 MG/1
40 TABLET, FILM COATED ORAL AT BEDTIME
Qty: 0 | Refills: 0 | Status: DISCONTINUED | OUTPATIENT
Start: 2018-04-23 | End: 2018-04-24

## 2018-04-23 RX ORDER — SODIUM CHLORIDE 9 MG/ML
500 INJECTION INTRAMUSCULAR; INTRAVENOUS; SUBCUTANEOUS
Qty: 0 | Refills: 0 | Status: DISCONTINUED | OUTPATIENT
Start: 2018-04-23 | End: 2018-04-24

## 2018-04-23 RX ORDER — CLOPIDOGREL BISULFATE 75 MG/1
75 TABLET, FILM COATED ORAL DAILY
Qty: 0 | Refills: 0 | Status: DISCONTINUED | OUTPATIENT
Start: 2018-04-24 | End: 2018-04-24

## 2018-04-23 RX ORDER — FUROSEMIDE 40 MG
40 TABLET ORAL DAILY
Qty: 0 | Refills: 0 | Status: DISCONTINUED | OUTPATIENT
Start: 2018-04-24 | End: 2018-04-24

## 2018-04-23 RX ADMIN — CLOPIDOGREL BISULFATE 75 MILLIGRAM(S): 75 TABLET, FILM COATED ORAL at 15:14

## 2018-04-23 RX ADMIN — ATORVASTATIN CALCIUM 40 MILLIGRAM(S): 80 TABLET, FILM COATED ORAL at 22:11

## 2018-04-23 RX ADMIN — SODIUM CHLORIDE 75 MILLILITER(S): 9 INJECTION INTRAMUSCULAR; INTRAVENOUS; SUBCUTANEOUS at 15:15

## 2018-04-24 ENCOUNTER — TRANSCRIPTION ENCOUNTER (OUTPATIENT)
Age: 68
End: 2018-04-24

## 2018-04-24 VITALS — TEMPERATURE: 97 F

## 2018-04-24 LAB
ANION GAP SERPL CALC-SCNC: 9 MMOL/L — SIGNIFICANT CHANGE UP (ref 5–17)
BUN SERPL-MCNC: 16 MG/DL — SIGNIFICANT CHANGE UP (ref 7–23)
CALCIUM SERPL-MCNC: 8.8 MG/DL — SIGNIFICANT CHANGE UP (ref 8.4–10.5)
CHLORIDE SERPL-SCNC: 100 MMOL/L — SIGNIFICANT CHANGE UP (ref 96–108)
CO2 SERPL-SCNC: 27 MMOL/L — SIGNIFICANT CHANGE UP (ref 22–31)
CREAT SERPL-MCNC: 0.81 MG/DL — SIGNIFICANT CHANGE UP (ref 0.5–1.3)
GLUCOSE BLDC GLUCOMTR-MCNC: 98 MG/DL — SIGNIFICANT CHANGE UP (ref 70–99)
GLUCOSE SERPL-MCNC: 100 MG/DL — HIGH (ref 70–99)
HCT VFR BLD CALC: 35.7 % — LOW (ref 39–50)
HGB BLD-MCNC: 11.7 G/DL — LOW (ref 13–17)
MAGNESIUM SERPL-MCNC: 1.9 MG/DL — SIGNIFICANT CHANGE UP (ref 1.6–2.6)
MCHC RBC-ENTMCNC: 30.6 PG — SIGNIFICANT CHANGE UP (ref 27–34)
MCHC RBC-ENTMCNC: 32.8 G/DL — SIGNIFICANT CHANGE UP (ref 32–36)
MCV RBC AUTO: 93.5 FL — SIGNIFICANT CHANGE UP (ref 80–100)
PLATELET # BLD AUTO: 194 K/UL — SIGNIFICANT CHANGE UP (ref 150–400)
POTASSIUM SERPL-MCNC: 3.3 MMOL/L — LOW (ref 3.5–5.3)
POTASSIUM SERPL-SCNC: 3.3 MMOL/L — LOW (ref 3.5–5.3)
RBC # BLD: 3.82 M/UL — LOW (ref 4.2–5.8)
RBC # FLD: 13.9 % — SIGNIFICANT CHANGE UP (ref 10.3–16.9)
SODIUM SERPL-SCNC: 136 MMOL/L — SIGNIFICANT CHANGE UP (ref 135–145)
WBC # BLD: 4.5 K/UL — SIGNIFICANT CHANGE UP (ref 3.8–10.5)
WBC # FLD AUTO: 4.5 K/UL — SIGNIFICANT CHANGE UP (ref 3.8–10.5)

## 2018-04-24 PROCEDURE — 83036 HEMOGLOBIN GLYCOSYLATED A1C: CPT

## 2018-04-24 PROCEDURE — 85025 COMPLETE CBC W/AUTO DIFF WBC: CPT

## 2018-04-24 PROCEDURE — 83735 ASSAY OF MAGNESIUM: CPT

## 2018-04-24 PROCEDURE — 93005 ELECTROCARDIOGRAM TRACING: CPT

## 2018-04-24 PROCEDURE — C1887: CPT

## 2018-04-24 PROCEDURE — 82962 GLUCOSE BLOOD TEST: CPT

## 2018-04-24 PROCEDURE — 85730 THROMBOPLASTIN TIME PARTIAL: CPT

## 2018-04-24 PROCEDURE — 85027 COMPLETE CBC AUTOMATED: CPT

## 2018-04-24 PROCEDURE — 36415 COLL VENOUS BLD VENIPUNCTURE: CPT

## 2018-04-24 PROCEDURE — 86140 C-REACTIVE PROTEIN: CPT

## 2018-04-24 PROCEDURE — C1725: CPT

## 2018-04-24 PROCEDURE — 82553 CREATINE MB FRACTION: CPT

## 2018-04-24 PROCEDURE — 99238 HOSP IP/OBS DSCHRG MGMT 30/<: CPT

## 2018-04-24 PROCEDURE — C1874: CPT

## 2018-04-24 PROCEDURE — 80061 LIPID PANEL: CPT

## 2018-04-24 PROCEDURE — C1769: CPT

## 2018-04-24 PROCEDURE — 82550 ASSAY OF CK (CPK): CPT

## 2018-04-24 PROCEDURE — 85610 PROTHROMBIN TIME: CPT

## 2018-04-24 PROCEDURE — 80048 BASIC METABOLIC PNL TOTAL CA: CPT

## 2018-04-24 RX ORDER — CLOPIDOGREL BISULFATE 75 MG/1
1 TABLET, FILM COATED ORAL
Qty: 30 | Refills: 11 | OUTPATIENT
Start: 2018-04-24 | End: 2019-04-18

## 2018-04-24 RX ORDER — POTASSIUM CHLORIDE 20 MEQ
40 PACKET (EA) ORAL EVERY 4 HOURS
Qty: 0 | Refills: 0 | Status: COMPLETED | OUTPATIENT
Start: 2018-04-24 | End: 2018-04-24

## 2018-04-24 RX ORDER — ATORVASTATIN CALCIUM 80 MG/1
1 TABLET, FILM COATED ORAL
Qty: 30 | Refills: 2 | OUTPATIENT
Start: 2018-04-24 | End: 2018-07-22

## 2018-04-24 RX ORDER — MAGNESIUM OXIDE 400 MG ORAL TABLET 241.3 MG
400 TABLET ORAL ONCE
Qty: 0 | Refills: 0 | Status: COMPLETED | OUTPATIENT
Start: 2018-04-24 | End: 2018-04-24

## 2018-04-24 RX ORDER — METFORMIN HYDROCHLORIDE 850 MG/1
1 TABLET ORAL
Qty: 0 | Refills: 0 | COMMUNITY

## 2018-04-24 RX ORDER — ASPIRIN/CALCIUM CARB/MAGNESIUM 324 MG
1 TABLET ORAL
Qty: 30 | Refills: 11 | OUTPATIENT
Start: 2018-04-24 | End: 2019-04-18

## 2018-04-24 RX ADMIN — MAGNESIUM OXIDE 400 MG ORAL TABLET 400 MILLIGRAM(S): 241.3 TABLET ORAL at 08:19

## 2018-04-24 RX ADMIN — Medication 60 MILLIGRAM(S): at 06:40

## 2018-04-24 RX ADMIN — Medication 40 MILLIEQUIVALENT(S): at 11:16

## 2018-04-24 RX ADMIN — PANTOPRAZOLE SODIUM 40 MILLIGRAM(S): 20 TABLET, DELAYED RELEASE ORAL at 06:39

## 2018-04-24 RX ADMIN — Medication 100 MILLIGRAM(S): at 06:40

## 2018-04-24 RX ADMIN — Medication 40 MILLIEQUIVALENT(S): at 08:19

## 2018-04-24 RX ADMIN — CLOPIDOGREL BISULFATE 75 MILLIGRAM(S): 75 TABLET, FILM COATED ORAL at 11:11

## 2018-04-24 RX ADMIN — Medication 81 MILLIGRAM(S): at 11:11

## 2018-04-24 NOTE — DISCHARGE NOTE ADULT - ADDITIONAL INSTRUCTIONS
Follow-up with your cardiologist Dr. Bullard in 1-2 weeks.    Follow-up with primary care Dr. Greene in 1-2 weeks. Follow-up with your cardiologist Dr. Bullard and Nurse Practitioner Denae Monroe in 1-2 weeks.    Follow-up with primary care Dr. Greene in 1-2 weeks.

## 2018-04-24 NOTE — DISCHARGE NOTE ADULT - PLAN OF CARE
Follow-up with your cardiologist Dr. Bullard in 1-2 weeks. You underwent a coronary angiogram and received a stent to your ramus and obtuse marginal coronary arteries. You should wait 3 days before returning to ordinary activities. Do not drive for 2 days. Consult your doctor before returning to vigorous activity. You may return to work in 3-5 days. The catheter from your wrist was removed and the dressing is now removed. You may shower, but avoid baths, hot tubs, or swimming for 5 days to prevent infection. If you notice bleeding from the site, hardening and pain at the site, drainage or redness from the site, coolness/paleness of the extremity, swelling, or fever, please call [phone]. Please continue your Aspirin and Plavix as prescribed unless otherwise indicated by your cardiologist. Stopping these medications for any reason can result in closure of your stents leading to a fatal heart attack. If you experience any chest pain, shortness of breath, or dizziness, please call your doctor and seek immediate medical attention. Please follow up with Dr. Bullard in 1-2 weeks.  All of your needed prescriptions have been sent electronically to your pharmacy. You have a history of elevated blood pressure and you should continue your blood pressure medications as prescribed. Please continue Atorvastatin 40 mg daily to ensure your cardiac stent remains open and to control your cholesterol levels. You have a diagnosis of diabetes and should continue all of your diabetic medications as prescribed. Please do not take your metformin for 2 days to prevent interaction with the contrast you received and resume regular use of your metformin on Thursday, April 26, 2018. Please follow-up with a primary care doctor regarding further management of your diabetes. Please continue Lasix 40 mg daily to help decrease swelling in your legs. Apply Ketoconazole cream to your leg wounds as prescribed. Please follow-up with your primary care doctor. If you experience any worsening leg swelling or pain, please call your doctor and seek immediate medical attention. Follow-up with your cardiologist Dr. Bullard and Nurse Practitioner Denae Monroe in 1-2 weeks.

## 2018-04-24 NOTE — DISCHARGE NOTE ADULT - MEDICATION SUMMARY - MEDICATIONS TO TAKE
I will START or STAY ON the medications listed below when I get home from the hospital:    aspirin 81 mg oral tablet  -- 1 tab(s) by mouth once a day  -- Indication: For Preventing your stents from closing    metFORMIN 1000 mg oral tablet  -- 1 tab(s) by mouth 2 times a day, hold for 2 days and resume regular use on Thursday, April 26, 2018  -- Indication: For Diabetes    atorvastatin 40 mg oral tablet  -- 1 tab(s) by mouth once a day  -- Indication: For Cholesterol    clopidogrel 75 mg oral tablet  -- 1 tab(s) by mouth once a day  -- Indication: For Preventing your stents from closing    metoprolol succinate 100 mg oral tablet, extended release  -- 1 tab(s) by mouth once a day  -- Indication: For Coronary artery disease    ProAir HFA 90 mcg/inh inhalation aerosol  -- 2 puff(s) inhaled 4 times a day, As Needed  -- Indication: For Asthma    Nifedical XL 60 mg oral tablet, extended release  -- 1 tab(s) by mouth once a day  -- Indication: For Hypertension    ketoconazole 2% topical cream  -- Apply on skin to affected area once a day tracey GORMAN  -- Indication: For Skin cream    furosemide 40 mg oral tablet  -- 1 tab(s) by mouth once a day  -- Indication: For Leg swelling    omeprazole 40 mg oral delayed release capsule  -- 1 cap(s) by mouth once a day  -- Indication: For Stomach Acid Protection

## 2018-04-24 NOTE — DISCHARGE NOTE ADULT - HOSPITAL COURSE
68 y/o male, marijuana smoker, with a PMHx of HTN, HLD, morbid obesity s/p sleeve gastrectomy, DM2, asthma, HCV, h/o PUD/GIB (approx. 4 years ago), MIRNA, chronic diastolic CHF (EF 55-60% by Echo 1/2018),  known CAD s/p prior NSTEMI 2009 and 2013, most recent angiogram @ 1/29/2018 at which time pt received PTCA/SASHA LAD, patent pLAD and LPDA stent, 75% OM1 ISR presented for staged PCI of residual OM1 lesion. Patient reports since procedure he has been feeling well. He denies CP, SOB, palpitations, n/v, diaphoresis, PND/orthopnea, LE edema, or syncope. Prior ECHO 1/29/18 revealed Normal LV size and wall thickness, LV wall motion is normal. LVEF is estimated to be 55-60%, moderately dilated LA, mild aortic valve thickening, pulmonary artery systolic pressure. No aortic root dilatation. In light of patient’s risk factors and known CAD with residual Stenosis, pt presented for staged PCI. Patient is now s/p cardiac catheterization 4/23/18 receiving SASHA to ramus and SASHA to OM1; other findings of LM normal, mLAD stent patent, Ramus 90% --> s/p SASHA Ramus, OM1 75% --> s/p SASHA OM1, RCA not injected, EF nl by recent echo, no valve dz. The patient initially removed his radial band himself which resulted in profuse bleeding at cath site. Radial band (RB) was reapplied for an additional hour.  RB removed w/o incident. Patient seen and examined at bedside this AM, 66 y/o male, marijuana smoker, with a PMHx of HTN, HLD, morbid obesity s/p sleeve gastrectomy, DM2, asthma, HCV, h/o PUD/GIB (approx. 4 years ago), MIRNA, chronic diastolic CHF (EF 55-60% by Echo 1/2018),  known CAD s/p prior NSTEMI 2009 and 2013, most recent angiogram @ 1/29/2018 at which time pt received PTCA/SASHA LAD, patent pLAD and LPDA stent, 75% OM1 ISR presented for staged PCI of residual OM1 lesion. Patient reports since procedure he has been feeling well. He denies CP, SOB, palpitations, n/v, diaphoresis, PND/orthopnea, LE edema, or syncope. Prior ECHO 1/29/18 revealed Normal LV size and wall thickness, LV wall motion is normal. LVEF is estimated to be 55-60%, moderately dilated LA, mild aortic valve thickening, pulmonary artery systolic pressure. No aortic root dilatation. In light of patient’s risk factors and known CAD with residual Stenosis, pt presented for staged PCI. Patient is now s/p cardiac catheterization 4/23/18 receiving SASHA to ramus and SASHA to OM1; other findings of LM normal, mLAD stent patent, Ramus 90% --> s/p SASHA Ramus, OM1 75% --> s/p SASHA OM1, RCA not injected, EF nl by recent echo, no valve dz. The patient initially removed his radial band himself which resulted in profuse bleeding at cath site. Radial band (RB) was reapplied for an additional hour.  RB removed w/o incident. Pt seen and examined at bedside, denies chest pain, shortness of breath, n/v/d, LE edema, wrist pain/ hematoma. Labs WNL, K 3.3 repleted with KCl 40 meq PO x 2 and Mg 1.9 repleted with Mag oxide 400 mg PO x1. VSS. EKG reviewed. No events overnight on telemetry. Patient will f/u with cardiologist Dr. Bullard in 1 -2 weeks and primary care Dr. Greene in 1-2 weeks. Instructed to return if symptoms worsen including but not limited to chest pain, shortness of breath, palpitations, syncope, dizziness, wrist pain/swelling. Discharged on Aspirin/Plavix/statin/BB. Pt deemed stable for discharge by attending Dr. Schwarz who agrees with above d/c plan.  V/S 	BP: 141/89 		HR: 91	 	RR: 16			T: 97.2		O2: 100% RA   	  GEN: NAD  PULM:  CTA B/L  CARD: Normal S1 S2, no JVD, no murmurs  ABD: +BS, obese, NT, soft/ND  WRIST: soft, no bleeding or hematoma, radial pulse 2+	  EXT: chronic venous stasis changes B/L  NEURO: A+Ox3, no focal deficits

## 2018-04-24 NOTE — DISCHARGE NOTE ADULT - CARE PROVIDER_API CALL
Keyon Bullard), Cardiology; Interventional Cardiology  [address] [address]  Phone: [phone]  Fax: [phone]    Osei Greene), Internal Medicine  [address] [address]  Phone: [phone]  Fax: [phone]

## 2018-04-24 NOTE — DISCHARGE NOTE ADULT - PATIENT PORTAL LINK FT
You can access the [facility] Patient Portal, offered by [facility], by registering with the following website: [email or web address]

## 2018-04-24 NOTE — PROGRESS NOTE ADULT - SUBJECTIVE AND OBJECTIVE BOX
Called by RN that patient with bleed at cath site. Pt seen at the bedside. R wrist profusely bleeding. Radial ban unfastened. Pt reporting unfastening radial band himselft given that he was told it would be removed at 7 pm and now it was past that time. Pressure applied with gauze and radial band refastened.  Instructed patient that ii would not be removed for one hour and this would be done by a clinician.    Radial band removed at 9 pm w/o incident. Pressure dressing applied. Pt given instructions.    Will continue to monitor.

## 2018-04-24 NOTE — DISCHARGE NOTE ADULT - CARE PROVIDERS DIRECT ADDRESSES
[email or web address]

## 2018-04-24 NOTE — DISCHARGE NOTE ADULT - CARE PLAN
Principal Discharge DX:	Coronary artery disease  Goal:	Follow-up with your cardiologist Dr. Bullard in 1-2 weeks.  Assessment and plan of treatment:	You underwent a coronary angiogram and received a stent to your ramus and obtuse marginal coronary arteries. You should wait 3 days before returning to ordinary activities. Do not drive for 2 days. Consult your doctor before returning to vigorous activity. You may return to work in 3-5 days. The catheter from your wrist was removed and the dressing is now removed. You may shower, but avoid baths, hot tubs, or swimming for 5 days to prevent infection. If you notice bleeding from the site, hardening and pain at the site, drainage or redness from the site, coolness/paleness of the extremity, swelling, or fever, please call [phone]. Please continue your Aspirin and Plavix as prescribed unless otherwise indicated by your cardiologist. Stopping these medications for any reason can result in closure of your stents leading to a fatal heart attack. If you experience any chest pain, shortness of breath, or dizziness, please call your doctor and seek immediate medical attention. Please follow up with Dr. Bullard in 1-2 weeks.  All of your needed prescriptions have been sent electronically to your pharmacy.  Secondary Diagnosis:	Essential hypertension  Assessment and plan of treatment:	You have a history of elevated blood pressure and you should continue your blood pressure medications as prescribed.  Secondary Diagnosis:	Dyslipidemia  Assessment and plan of treatment:	Please continue Atorvastatin 40 mg daily to ensure your cardiac stent remains open and to control your cholesterol levels.  Secondary Diagnosis:	Type 2 diabetes mellitus  Assessment and plan of treatment:	You have a diagnosis of diabetes and should continue all of your diabetic medications as prescribed. Please do not take your metformin for 2 days to prevent interaction with the contrast you received and resume regular use of your metformin on Thursday, April 26, 2018. Please follow-up with a primary care doctor regarding further management of your diabetes.  Secondary Diagnosis:	Chronic venous stasis dermatitis of both lower extremities  Assessment and plan of treatment:	Please continue Lasix 40 mg daily to help decrease swelling in your legs. Apply Ketoconazole cream to your leg wounds as prescribed. Please follow-up with your primary care doctor. If you experience any worsening leg swelling or pain, please call your doctor and seek immediate medical attention. Principal Discharge DX:	Coronary artery disease  Goal:	Follow-up with your cardiologist Dr. Bullard and Nurse Practitioner Denae Monroe in 1-2 weeks.  Assessment and plan of treatment:	You underwent a coronary angiogram and received a stent to your ramus and obtuse marginal coronary arteries. You should wait 3 days before returning to ordinary activities. Do not drive for 2 days. Consult your doctor before returning to vigorous activity. You may return to work in 3-5 days. The catheter from your wrist was removed and the dressing is now removed. You may shower, but avoid baths, hot tubs, or swimming for 5 days to prevent infection. If you notice bleeding from the site, hardening and pain at the site, drainage or redness from the site, coolness/paleness of the extremity, swelling, or fever, please call [phone]. Please continue your Aspirin and Plavix as prescribed unless otherwise indicated by your cardiologist. Stopping these medications for any reason can result in closure of your stents leading to a fatal heart attack. If you experience any chest pain, shortness of breath, or dizziness, please call your doctor and seek immediate medical attention. Please follow up with Dr. Bullard in 1-2 weeks.  All of your needed prescriptions have been sent electronically to your pharmacy.  Secondary Diagnosis:	Essential hypertension  Assessment and plan of treatment:	You have a history of elevated blood pressure and you should continue your blood pressure medications as prescribed.  Secondary Diagnosis:	Dyslipidemia  Assessment and plan of treatment:	Please continue Atorvastatin 40 mg daily to ensure your cardiac stent remains open and to control your cholesterol levels.  Secondary Diagnosis:	Type 2 diabetes mellitus  Assessment and plan of treatment:	You have a diagnosis of diabetes and should continue all of your diabetic medications as prescribed. Please do not take your metformin for 2 days to prevent interaction with the contrast you received and resume regular use of your metformin on Thursday, April 26, 2018. Please follow-up with a primary care doctor regarding further management of your diabetes.  Secondary Diagnosis:	Chronic venous stasis dermatitis of both lower extremities  Assessment and plan of treatment:	Please continue Lasix 40 mg daily to help decrease swelling in your legs. Apply Ketoconazole cream to your leg wounds as prescribed. Please follow-up with your primary care doctor. If you experience any worsening leg swelling or pain, please call your doctor and seek immediate medical attention.

## 2018-04-27 DIAGNOSIS — I11.0 HYPERTENSIVE HEART DISEASE WITH HEART FAILURE: ICD-10-CM

## 2018-04-27 DIAGNOSIS — I25.2 OLD MYOCARDIAL INFARCTION: ICD-10-CM

## 2018-04-27 DIAGNOSIS — I25.110 ATHEROSCLEROTIC HEART DISEASE OF NATIVE CORONARY ARTERY WITH UNSTABLE ANGINA PECTORIS: ICD-10-CM

## 2018-04-27 DIAGNOSIS — E78.5 HYPERLIPIDEMIA, UNSPECIFIED: ICD-10-CM

## 2018-04-27 DIAGNOSIS — E11.9 TYPE 2 DIABETES MELLITUS WITHOUT COMPLICATIONS: ICD-10-CM

## 2018-04-27 DIAGNOSIS — J45.909 UNSPECIFIED ASTHMA, UNCOMPLICATED: ICD-10-CM

## 2018-04-27 DIAGNOSIS — Z98.84 BARIATRIC SURGERY STATUS: ICD-10-CM

## 2018-04-27 DIAGNOSIS — E66.01 MORBID (SEVERE) OBESITY DUE TO EXCESS CALORIES: ICD-10-CM

## 2018-04-27 DIAGNOSIS — M17.0 BILATERAL PRIMARY OSTEOARTHRITIS OF KNEE: ICD-10-CM

## 2018-04-27 DIAGNOSIS — R07.9 CHEST PAIN, UNSPECIFIED: ICD-10-CM

## 2018-04-27 DIAGNOSIS — B18.2 CHRONIC VIRAL HEPATITIS C: ICD-10-CM

## 2018-04-27 DIAGNOSIS — I50.32 CHRONIC DIASTOLIC (CONGESTIVE) HEART FAILURE: ICD-10-CM

## 2018-04-27 DIAGNOSIS — Z95.5 PRESENCE OF CORONARY ANGIOPLASTY IMPLANT AND GRAFT: ICD-10-CM

## 2018-04-27 DIAGNOSIS — Z91.018 ALLERGY TO OTHER FOODS: ICD-10-CM

## 2018-05-03 ENCOUNTER — APPOINTMENT (OUTPATIENT)
Dept: GASTROENTEROLOGY | Facility: CLINIC | Age: 68
End: 2018-05-03
Payer: MEDICARE

## 2018-05-03 VITALS
RESPIRATION RATE: 16 BRPM | TEMPERATURE: 97.9 F | DIASTOLIC BLOOD PRESSURE: 71 MMHG | SYSTOLIC BLOOD PRESSURE: 125 MMHG | BODY MASS INDEX: 44.48 KG/M2 | OXYGEN SATURATION: 97 % | WEIGHT: 310 LBS | HEART RATE: 79 BPM

## 2018-05-03 DIAGNOSIS — R10.13 EPIGASTRIC PAIN: ICD-10-CM

## 2018-05-03 DIAGNOSIS — Z12.11 ENCOUNTER FOR SCREENING FOR MALIGNANT NEOPLASM OF COLON: ICD-10-CM

## 2018-05-03 PROCEDURE — 99203 OFFICE O/P NEW LOW 30 MIN: CPT

## 2018-05-16 ENCOUNTER — APPOINTMENT (OUTPATIENT)
Dept: HEART AND VASCULAR | Facility: CLINIC | Age: 68
End: 2018-05-16
Payer: MEDICARE

## 2018-05-16 VITALS — HEART RATE: 80 BPM | SYSTOLIC BLOOD PRESSURE: 149 MMHG | OXYGEN SATURATION: 98 % | DIASTOLIC BLOOD PRESSURE: 91 MMHG

## 2018-05-16 PROCEDURE — 99214 OFFICE O/P EST MOD 30 MIN: CPT | Mod: 25

## 2018-05-16 PROCEDURE — 93000 ELECTROCARDIOGRAM COMPLETE: CPT

## 2018-05-18 ENCOUNTER — APPOINTMENT (OUTPATIENT)
Dept: ENDOCRINOLOGY | Facility: CLINIC | Age: 68
End: 2018-05-18

## 2018-05-18 VITALS
WEIGHT: 299 LBS | BODY MASS INDEX: 42.8 KG/M2 | HEART RATE: 80 BPM | DIASTOLIC BLOOD PRESSURE: 94 MMHG | SYSTOLIC BLOOD PRESSURE: 148 MMHG | HEIGHT: 70 IN

## 2018-05-18 RX ORDER — SEMAGLUTIDE 1.34 MG/ML
2 INJECTION, SOLUTION SUBCUTANEOUS
Qty: 3 | Refills: 3 | Status: DISCONTINUED | COMMUNITY
Start: 2018-05-18 | End: 2018-05-18

## 2018-07-23 PROBLEM — I87.2 VENOUS INSUFFICIENCY: Status: RESOLVED | Noted: 2017-10-10 | Resolved: 2018-07-23

## 2018-07-23 PROBLEM — R10.13 DYSPEPSIA: Status: ACTIVE | Noted: 2018-05-03

## 2018-08-28 ENCOUNTER — MEDICATION RENEWAL (OUTPATIENT)
Age: 68
End: 2018-08-28

## 2018-09-12 RX ORDER — NIFEDIPINE 30 MG/1
30 TABLET, EXTENDED RELEASE ORAL DAILY
Qty: 90 | Refills: 3 | Status: ACTIVE | COMMUNITY
Start: 2018-04-11 | End: 1900-01-01

## 2018-09-12 RX ORDER — CLOPIDOGREL BISULFATE 75 MG/1
75 TABLET, FILM COATED ORAL DAILY
Qty: 90 | Refills: 3 | Status: ACTIVE | COMMUNITY
Start: 2018-04-11

## 2018-09-12 RX ORDER — ATORVASTATIN CALCIUM 80 MG/1
80 TABLET, FILM COATED ORAL
Qty: 90 | Refills: 3 | Status: ACTIVE | COMMUNITY
Start: 2018-04-11 | End: 1900-01-01

## 2018-09-12 RX ORDER — EZETIMIBE 10 MG/1
10 TABLET ORAL DAILY
Qty: 90 | Refills: 3 | Status: ACTIVE | COMMUNITY
Start: 2018-04-11 | End: 1900-01-01

## 2018-09-12 RX ORDER — FUROSEMIDE 40 MG/1
40 TABLET ORAL
Qty: 30 | Refills: 0 | Status: ACTIVE | COMMUNITY
Start: 2018-02-21

## 2018-09-12 RX ORDER — LOSARTAN POTASSIUM 50 MG/1
50 TABLET, FILM COATED ORAL DAILY
Qty: 90 | Refills: 3 | Status: ACTIVE | COMMUNITY
Start: 2018-04-11 | End: 1900-01-01

## 2018-10-17 ENCOUNTER — APPOINTMENT (OUTPATIENT)
Dept: ENDOCRINOLOGY | Facility: CLINIC | Age: 68
End: 2018-10-17
Payer: MEDICARE

## 2018-10-17 VITALS
WEIGHT: 282 LBS | SYSTOLIC BLOOD PRESSURE: 136 MMHG | HEIGHT: 70 IN | HEART RATE: 81 BPM | BODY MASS INDEX: 40.37 KG/M2 | DIASTOLIC BLOOD PRESSURE: 88 MMHG

## 2018-10-17 PROBLEM — I21.4 NON-ST ELEVATION (NSTEMI) MYOCARDIAL INFARCTION: Chronic | Status: ACTIVE | Noted: 2018-01-27

## 2018-10-17 PROBLEM — R07.9 CHEST PAIN, UNSPECIFIED: Chronic | Status: ACTIVE | Noted: 2018-01-27

## 2018-10-17 PROBLEM — K21.9 GASTRO-ESOPHAGEAL REFLUX DISEASE WITHOUT ESOPHAGITIS: Chronic | Status: ACTIVE | Noted: 2018-01-27

## 2018-10-17 PROBLEM — Z95.5 PRESENCE OF CORONARY ANGIOPLASTY IMPLANT AND GRAFT: Chronic | Status: ACTIVE | Noted: 2018-01-27

## 2018-10-17 PROBLEM — J45.909 UNSPECIFIED ASTHMA, UNCOMPLICATED: Chronic | Status: ACTIVE | Noted: 2018-01-27

## 2018-10-17 PROBLEM — M17.10 UNILATERAL PRIMARY OSTEOARTHRITIS, UNSPECIFIED KNEE: Chronic | Status: ACTIVE | Noted: 2018-01-27

## 2018-10-17 PROBLEM — E78.5 HYPERLIPIDEMIA, UNSPECIFIED: Chronic | Status: ACTIVE | Noted: 2018-01-27

## 2018-10-17 LAB
GLUCOSE BLDC GLUCOMTR-MCNC: 118
HBA1C MFR BLD HPLC: 6.7

## 2018-10-17 PROCEDURE — 82962 GLUCOSE BLOOD TEST: CPT

## 2018-10-17 PROCEDURE — 83036 HEMOGLOBIN GLYCOSYLATED A1C: CPT | Mod: QW

## 2019-02-06 ENCOUNTER — APPOINTMENT (OUTPATIENT)
Dept: ENDOCRINOLOGY | Facility: CLINIC | Age: 69
End: 2019-02-06

## 2019-02-06 VITALS — WEIGHT: 299 LBS | BODY MASS INDEX: 42.9 KG/M2

## 2019-03-06 ENCOUNTER — TRANSCRIPTION ENCOUNTER (OUTPATIENT)
Age: 69
End: 2019-03-06

## 2019-03-19 ENCOUNTER — APPOINTMENT (OUTPATIENT)
Dept: ENDOCRINOLOGY | Facility: CLINIC | Age: 69
End: 2019-03-19
Payer: MEDICARE

## 2019-03-19 ENCOUNTER — APPOINTMENT (OUTPATIENT)
Age: 69
End: 2019-03-19
Payer: COMMERCIAL

## 2019-03-19 VITALS
WEIGHT: 286 LBS | BODY MASS INDEX: 38.74 KG/M2 | HEART RATE: 88 BPM | DIASTOLIC BLOOD PRESSURE: 85 MMHG | HEIGHT: 72 IN | SYSTOLIC BLOOD PRESSURE: 143 MMHG

## 2019-03-19 DIAGNOSIS — N18.9 CHRONIC KIDNEY DISEASE, UNSPECIFIED: ICD-10-CM

## 2019-03-19 DIAGNOSIS — E01.0 IODINE-DEFICIENCY RELATED DIFFUSE (ENDEMIC) GOITER: ICD-10-CM

## 2019-03-19 DIAGNOSIS — E11.9 TYPE 2 DIABETES MELLITUS W/OUT COMPLICATIONS: ICD-10-CM

## 2019-03-19 DIAGNOSIS — E78.5 HYPERLIPIDEMIA, UNSPECIFIED: ICD-10-CM

## 2019-03-19 PROCEDURE — 83036 HEMOGLOBIN GLYCOSYLATED A1C: CPT | Mod: QW

## 2019-03-19 PROCEDURE — 97802 MEDICAL NUTRITION INDIV IN: CPT

## 2019-03-19 PROCEDURE — 82962 GLUCOSE BLOOD TEST: CPT

## 2019-03-19 PROCEDURE — 99214 OFFICE O/P EST MOD 30 MIN: CPT | Mod: 25

## 2019-03-19 RX ORDER — DULAGLUTIDE 1.5 MG/.5ML
1.5 INJECTION, SOLUTION SUBCUTANEOUS
Qty: 1 | Refills: 5 | Status: ACTIVE | COMMUNITY
Start: 2018-05-18 | End: 1900-01-01

## 2019-03-19 RX ORDER — METOPROLOL SUCCINATE 100 MG/1
100 TABLET, EXTENDED RELEASE ORAL
Qty: 90 | Refills: 3 | Status: ACTIVE | COMMUNITY
Start: 2016-12-22 | End: 1900-01-01

## 2019-03-27 ENCOUNTER — FORM ENCOUNTER (OUTPATIENT)
Age: 69
End: 2019-03-27

## 2019-03-28 ENCOUNTER — APPOINTMENT (OUTPATIENT)
Dept: ULTRASOUND IMAGING | Facility: HOSPITAL | Age: 69
End: 2019-03-28
Payer: MEDICARE

## 2019-03-28 ENCOUNTER — OUTPATIENT (OUTPATIENT)
Dept: OUTPATIENT SERVICES | Facility: HOSPITAL | Age: 69
LOS: 1 days | End: 2019-03-28
Payer: MEDICARE

## 2019-03-28 DIAGNOSIS — Z90.3 ACQUIRED ABSENCE OF STOMACH [PART OF]: Chronic | ICD-10-CM

## 2019-03-28 DIAGNOSIS — Z95.5 PRESENCE OF CORONARY ANGIOPLASTY IMPLANT AND GRAFT: Chronic | ICD-10-CM

## 2019-03-28 PROCEDURE — 76536 US EXAM OF HEAD AND NECK: CPT | Mod: 26

## 2019-03-28 PROCEDURE — 76536 US EXAM OF HEAD AND NECK: CPT

## 2019-04-05 ENCOUNTER — APPOINTMENT (OUTPATIENT)
Dept: HEART AND VASCULAR | Facility: CLINIC | Age: 69
End: 2019-04-05

## 2019-04-05 ENCOUNTER — NON-APPOINTMENT (OUTPATIENT)
Age: 69
End: 2019-04-05

## 2019-04-11 LAB
ALBUMIN SERPL ELPH-MCNC: 4.3 G/DL
ALP BLD-CCNC: 84 U/L
ALT SERPL-CCNC: 10 U/L
ANION GAP SERPL CALC-SCNC: 13 MMOL/L
AST SERPL-CCNC: 18 U/L
BASOPHILS # BLD AUTO: 0.03 K/UL
BASOPHILS NFR BLD AUTO: 0.6 %
BILIRUB SERPL-MCNC: 0.5 MG/DL
BUN SERPL-MCNC: 15 MG/DL
CALCIUM SERPL-MCNC: 9.6 MG/DL
CHLORIDE SERPL-SCNC: 102 MMOL/L
CHOLEST SERPL-MCNC: 153 MG/DL
CHOLEST/HDLC SERPL: 2.2 RATIO
CO2 SERPL-SCNC: 26 MMOL/L
CREAT SERPL-MCNC: 0.83 MG/DL
CREAT SPEC-SCNC: 331 MG/DL
EOSINOPHIL # BLD AUTO: 0.03 K/UL
EOSINOPHIL NFR BLD AUTO: 0.6 %
GLUCOSE BLDC GLUCOMTR-MCNC: 119
GLUCOSE SERPL-MCNC: 127 MG/DL
HBA1C MFR BLD HPLC: 6.3
HBA1C MFR BLD HPLC: 6.5 %
HCT VFR BLD CALC: 39.3 %
HDLC SERPL-MCNC: 71 MG/DL
HGB BLD-MCNC: 12.1 G/DL
IMM GRANULOCYTES NFR BLD AUTO: 0.2 %
LDLC SERPL CALC-MCNC: 69 MG/DL
LYMPHOCYTES # BLD AUTO: 1.29 K/UL
LYMPHOCYTES NFR BLD AUTO: 27.2 %
MAN DIFF?: NORMAL
MCHC RBC-ENTMCNC: 30.3 PG
MCHC RBC-ENTMCNC: 30.8 GM/DL
MCV RBC AUTO: 98.5 FL
MICROALBUMIN 24H UR DL<=1MG/L-MCNC: 2.4 MG/DL
MICROALBUMIN/CREAT 24H UR-RTO: 7 MG/G
MONOCYTES # BLD AUTO: 0.29 K/UL
MONOCYTES NFR BLD AUTO: 6.1 %
NEUTROPHILS # BLD AUTO: 3.09 K/UL
NEUTROPHILS NFR BLD AUTO: 65.3 %
PLATELET # BLD AUTO: 146 K/UL
POTASSIUM SERPL-SCNC: 3.8 MMOL/L
PROT SERPL-MCNC: 7.8 G/DL
RBC # BLD: 3.99 M/UL
RBC # FLD: 13.5 %
SODIUM SERPL-SCNC: 141 MMOL/L
T3 SERPL-MCNC: 96 NG/DL
T4 FREE SERPL-MCNC: 1.2 NG/DL
TRIGL SERPL-MCNC: 65 MG/DL
TSH SERPL-ACNC: 2.57 UIU/ML
WBC # FLD AUTO: 4.74 K/UL

## 2019-04-11 NOTE — PHYSICAL EXAM
[Alert] : alert [No Acute Distress] : no acute distress [Normal Voice/Communication] : normal voice communication [No Proptosis] : no proptosis [Normal Sclera/Conjunctiva] : normal sclera/conjunctiva [No Lid Lag] : no lid lag [Normal Rate and Effort] : normal respiratory rhythm and effort [No Respiratory Distress] : no respiratory distress [No Accessory Muscle Use] : no accessory muscle use [Clear to Auscultation] : lungs were clear to auscultation bilaterally [Normal PMI] : the apical impulse was normal [Normal S1, S2] : normal S1 and S2 [Normal Rate] : heart rate was normal  [Regular Rhythm] : with a regular rhythm [No Stigmata of Cushings Syndrome] : no stigmata of cushings syndrome [No Clubbing, Cyanosis] : no clubbing  or cyanosis of the fingernails [No Involuntary Movements] : no involuntary movements were seen [No Rash] : no rash [No Tremors] : no tremors [Oriented x3] : oriented to person, place, and time [Acne] : no acne [de-identified] : morbidly obese [de-identified] : walks with walker - knee arthritis; surgery postponed until he loses weight [de-identified] : ulcer on right leg; for a long time - followed by vascular surgeon; VNS comes q 2 days to change dressing. [de-identified] : thyromegaly; no palpable nodules

## 2019-04-11 NOTE — ASSESSMENT
[FreeTextEntry1] : Diabetes - well controlled; \par will continue the same Tx. labs today.\par referred to RD for weight management.\par Will start SMBG occasionally, especially if has symptoms of hypoglycemia - contour next meter given.\par Thyromegaly - thyroid US.\par F/U in 3 months.

## 2019-04-11 NOTE — ADDENDUM
[FreeTextEntry1] : 4/11/19 MK\sedrick Called pt to discuss lab and US results - could not LM - " the phone is not in service".

## 2019-04-11 NOTE — HISTORY OF PRESENT ILLNESS
[FreeTextEntry1] : A 67 yo M, former study patient came to establish care for DM management (the study is completed).\par Diagnosed with type 2 DM about 30 years ago. Currently managed by metformin and Trulicity.\par Reports losing 20 lb by modifying diet over the last 3 months.\par Hx of bariatric surgery in Aug of 2017. No change in weight after it.\par PMH includes HTN, CAD;CKD; poor circulation; \par recent MRI revealed brain aneurism - will follow with neurosurgeon next week.\par Podiatrist - q 2 months\par Ophthalmologist - due.\par

## 2019-06-21 ENCOUNTER — APPOINTMENT (OUTPATIENT)
Dept: ENDOCRINOLOGY | Facility: CLINIC | Age: 69
End: 2019-06-21

## 2019-10-30 ENCOUNTER — APPOINTMENT (OUTPATIENT)
Dept: ENDOCRINOLOGY | Facility: CLINIC | Age: 69
End: 2019-10-30

## 2020-12-16 PROBLEM — Z12.11 ENCOUNTER FOR SCREENING FOR MALIGNANT NEOPLASM OF COLON: Status: RESOLVED | Noted: 2018-05-03 | Resolved: 2020-12-16

## 2021-12-27 ENCOUNTER — TRANSCRIPTION ENCOUNTER (OUTPATIENT)
Age: 71
End: 2021-12-27

## 2022-01-12 ENCOUNTER — TRANSCRIPTION ENCOUNTER (OUTPATIENT)
Age: 72
End: 2022-01-12

## 2022-02-02 NOTE — H&P ADULT - NS MD HP PULSE CAROTID
I called and left a message for patient with results as per patient request.  Biopsies are negative.  Patient instructed to call back with questions.   Lisseth Contreras MD     75 left normal/right normal

## 2022-04-16 ENCOUNTER — EMERGENCY (EMERGENCY)
Facility: HOSPITAL | Age: 72
LOS: 1 days | Discharge: HOME | End: 2022-04-16
Attending: EMERGENCY MEDICINE
Payer: MEDICARE

## 2022-04-16 VITALS
OXYGEN SATURATION: 98 % | HEIGHT: 72 IN | DIASTOLIC BLOOD PRESSURE: 83 MMHG | HEART RATE: 123 BPM | SYSTOLIC BLOOD PRESSURE: 137 MMHG | WEIGHT: 300.05 LBS | RESPIRATION RATE: 18 BRPM

## 2022-04-16 VITALS
RESPIRATION RATE: 16 BRPM | SYSTOLIC BLOOD PRESSURE: 122 MMHG | HEART RATE: 96 BPM | OXYGEN SATURATION: 100 % | DIASTOLIC BLOOD PRESSURE: 80 MMHG | TEMPERATURE: 98 F

## 2022-04-16 DIAGNOSIS — Z90.3 ACQUIRED ABSENCE OF STOMACH [PART OF]: Chronic | ICD-10-CM

## 2022-04-16 LAB
ALBUMIN SERPL ELPH-MCNC: 3 G/DL — LOW (ref 3.5–5)
ALP SERPL-CCNC: 68 U/L — SIGNIFICANT CHANGE UP (ref 40–120)
ALT FLD-CCNC: 18 U/L DA — SIGNIFICANT CHANGE UP (ref 10–60)
ANION GAP SERPL CALC-SCNC: 4 MMOL/L — LOW (ref 5–17)
AST SERPL-CCNC: 24 U/L — SIGNIFICANT CHANGE UP (ref 10–40)
BASOPHILS # BLD AUTO: 0.02 K/UL — SIGNIFICANT CHANGE UP (ref 0–0.2)
BASOPHILS NFR BLD AUTO: 0.2 % — SIGNIFICANT CHANGE UP (ref 0–2)
BILIRUB SERPL-MCNC: 0.4 MG/DL — SIGNIFICANT CHANGE UP (ref 0.2–1.2)
BUN SERPL-MCNC: 16 MG/DL — SIGNIFICANT CHANGE UP (ref 7–18)
CALCIUM SERPL-MCNC: 8.8 MG/DL — SIGNIFICANT CHANGE UP (ref 8.4–10.5)
CHLORIDE SERPL-SCNC: 108 MMOL/L — SIGNIFICANT CHANGE UP (ref 96–108)
CO2 SERPL-SCNC: 27 MMOL/L — SIGNIFICANT CHANGE UP (ref 22–31)
CREAT SERPL-MCNC: 0.84 MG/DL — SIGNIFICANT CHANGE UP (ref 0.5–1.3)
D DIMER BLD IA.RAPID-MCNC: <150 NG/ML DDU — SIGNIFICANT CHANGE UP
EGFR: 94 ML/MIN/1.73M2 — SIGNIFICANT CHANGE UP
EOSINOPHIL # BLD AUTO: 0.05 K/UL — SIGNIFICANT CHANGE UP (ref 0–0.5)
EOSINOPHIL NFR BLD AUTO: 0.6 % — SIGNIFICANT CHANGE UP (ref 0–6)
GLUCOSE SERPL-MCNC: 160 MG/DL — HIGH (ref 70–99)
HCT VFR BLD CALC: 35.4 % — LOW (ref 39–50)
HGB BLD-MCNC: 11.5 G/DL — LOW (ref 13–17)
IMM GRANULOCYTES NFR BLD AUTO: 0.5 % — SIGNIFICANT CHANGE UP (ref 0–1.5)
LIDOCAIN IGE QN: 155 U/L — SIGNIFICANT CHANGE UP (ref 73–393)
LYMPHOCYTES # BLD AUTO: 0.77 K/UL — LOW (ref 1–3.3)
LYMPHOCYTES # BLD AUTO: 9.1 % — LOW (ref 13–44)
MAGNESIUM SERPL-MCNC: 1.4 MG/DL — LOW (ref 1.6–2.6)
MCHC RBC-ENTMCNC: 30.9 PG — SIGNIFICANT CHANGE UP (ref 27–34)
MCHC RBC-ENTMCNC: 32.5 GM/DL — SIGNIFICANT CHANGE UP (ref 32–36)
MCV RBC AUTO: 95.2 FL — SIGNIFICANT CHANGE UP (ref 80–100)
MONOCYTES # BLD AUTO: 0.45 K/UL — SIGNIFICANT CHANGE UP (ref 0–0.9)
MONOCYTES NFR BLD AUTO: 5.3 % — SIGNIFICANT CHANGE UP (ref 2–14)
NEUTROPHILS # BLD AUTO: 7.11 K/UL — SIGNIFICANT CHANGE UP (ref 1.8–7.4)
NEUTROPHILS NFR BLD AUTO: 84.3 % — HIGH (ref 43–77)
NRBC # BLD: 0 /100 WBCS — SIGNIFICANT CHANGE UP (ref 0–0)
NT-PROBNP SERPL-SCNC: 59 PG/ML — SIGNIFICANT CHANGE UP (ref 0–125)
PLATELET # BLD AUTO: 211 K/UL — SIGNIFICANT CHANGE UP (ref 150–400)
POTASSIUM SERPL-MCNC: 3.8 MMOL/L — SIGNIFICANT CHANGE UP (ref 3.5–5.3)
POTASSIUM SERPL-SCNC: 3.8 MMOL/L — SIGNIFICANT CHANGE UP (ref 3.5–5.3)
PROT SERPL-MCNC: 7 G/DL — SIGNIFICANT CHANGE UP (ref 6–8.3)
RBC # BLD: 3.72 M/UL — LOW (ref 4.2–5.8)
RBC # FLD: 12.9 % — SIGNIFICANT CHANGE UP (ref 10.3–14.5)
SODIUM SERPL-SCNC: 139 MMOL/L — SIGNIFICANT CHANGE UP (ref 135–145)
TROPONIN I, HIGH SENSITIVITY RESULT: 18.9 NG/L — SIGNIFICANT CHANGE UP
TROPONIN I, HIGH SENSITIVITY RESULT: 9.5 NG/L — SIGNIFICANT CHANGE UP
WBC # BLD: 8.44 K/UL — SIGNIFICANT CHANGE UP (ref 3.8–10.5)
WBC # FLD AUTO: 8.44 K/UL — SIGNIFICANT CHANGE UP (ref 3.8–10.5)

## 2022-04-16 PROCEDURE — 80053 COMPREHEN METABOLIC PANEL: CPT

## 2022-04-16 PROCEDURE — 99285 EMERGENCY DEPT VISIT HI MDM: CPT

## 2022-04-16 PROCEDURE — 85025 COMPLETE CBC W/AUTO DIFF WBC: CPT

## 2022-04-16 PROCEDURE — 36415 COLL VENOUS BLD VENIPUNCTURE: CPT

## 2022-04-16 PROCEDURE — 93005 ELECTROCARDIOGRAM TRACING: CPT

## 2022-04-16 PROCEDURE — 71045 X-RAY EXAM CHEST 1 VIEW: CPT | Mod: 26

## 2022-04-16 PROCEDURE — 99284 EMERGENCY DEPT VISIT MOD MDM: CPT | Mod: 25

## 2022-04-16 PROCEDURE — 83735 ASSAY OF MAGNESIUM: CPT

## 2022-04-16 PROCEDURE — 84484 ASSAY OF TROPONIN QUANT: CPT

## 2022-04-16 PROCEDURE — 96374 THER/PROPH/DIAG INJ IV PUSH: CPT

## 2022-04-16 PROCEDURE — 93010 ELECTROCARDIOGRAM REPORT: CPT

## 2022-04-16 PROCEDURE — 83690 ASSAY OF LIPASE: CPT

## 2022-04-16 PROCEDURE — 83880 ASSAY OF NATRIURETIC PEPTIDE: CPT

## 2022-04-16 PROCEDURE — 85379 FIBRIN DEGRADATION QUANT: CPT

## 2022-04-16 PROCEDURE — 71045 X-RAY EXAM CHEST 1 VIEW: CPT

## 2022-04-16 RX ORDER — FAMOTIDINE 10 MG/ML
20 INJECTION INTRAVENOUS ONCE
Refills: 0 | Status: COMPLETED | OUTPATIENT
Start: 2022-04-16 | End: 2022-04-16

## 2022-04-16 RX ORDER — LIDOCAINE 4 G/100G
10 CREAM TOPICAL ONCE
Refills: 0 | Status: COMPLETED | OUTPATIENT
Start: 2022-04-16 | End: 2022-04-16

## 2022-04-16 RX ADMIN — Medication 30 MILLILITER(S): at 06:31

## 2022-04-16 RX ADMIN — LIDOCAINE 10 MILLILITER(S): 4 CREAM TOPICAL at 06:31

## 2022-04-16 RX ADMIN — FAMOTIDINE 20 MILLIGRAM(S): 10 INJECTION INTRAVENOUS at 06:31

## 2022-04-16 NOTE — ED PROVIDER NOTE - OBJECTIVE STATEMENT
71 year old male PMH HTN, HLD, DM, CAD with 3 stents, asthma, PUD/gastritis coming in with burning epigastric abdominal pain that started yesterday evening around 5pm. pt states he took antacids at home and initially had mild improvement of the pain but at night symptoms returned and woke him up from sleep. states now feels the sensation in his chest. denies back pains, ha, dizziness, sob, palpitations, D/C/N/V, urinary complaints.

## 2022-04-16 NOTE — ED ADULT NURSE NOTE - OBJECTIVE STATEMENT
came to Ed due to sternal pain today denies shortness of breath, seen and examined by Dr Cantu, EKG done, attached to cardiac monitor, blood sent, meds given.

## 2022-04-16 NOTE — ED PROVIDER NOTE - PATIENT PORTAL LINK FT
You can access the FollowMyHealth Patient Portal offered by [facility] by registering at the following website: [email or web address]. By joining [employer]’s FollowMyHealth portal, you will also be able to view your health information using other applications (apps) compatible with our system.

## 2022-04-16 NOTE — ED ADULT NURSE NOTE - GASTROINTESTINAL ASSESSMENT
ANTICOAGULATION MANAGEMENT     Patient Name:  Mariya Rowe  Date:  2/25/2020    ASSESSMENT /SUBJECTIVE:      Today's INR result of 3.0 is therapeutic. Goal INR of 2.0-3.0      Warfarin dose taken: Warfarin taken as previously instructed    Diet: No new diet changes affecting INR    Medication changes/ interactions: No new medications/supplements affecting INR    Previous INR: Therapeutic     S/S of bleeding or thromboembolism: No    New injury or illness:  No    Upcoming surgery, procedure or cardioversion:  No    Additional findings: None      PLAN:    Left detailed message for Mariya regarding INR result and instructed:     Warfarin Dosing Instructions: Continue your current warfarin dose    Instructed patient to follow up no later than: 2 weeks  Patient to recheck with home meter    Education provided: No    Instructed to call the Anticoagulation Clinic for any changes, questions or concerns. (#[phone])        OBJECTIVE:  INR   Date Value Ref Range Status   02/25/2020 3.0 (A) 0.90 - 1.10 Final             Anticoagulation Summary  As of 2/25/2020    INR goal:   2.0-3.0   TTR:   60.5 % (1 y)   INR used for dosing:   3.0 (2/25/2020)   Warfarin maintenance plan:   2.5 mg (5 mg x 0.5) every Wed, Sat; 5 mg (5 mg x 1) all other days   Full warfarin instructions:   2.5 mg every Wed, Sat; 5 mg all other days   Weekly warfarin total:   30 mg   Plan last modified:   Anita Palmer, RN (1/15/2020)   Next INR check:      Priority:   Maintenance   Target end date:       Indications    Long term current use of anticoagulants with INR goal of 2.0-3.0 [Z79.01]  Atrial fibrillation (H) [I48.91]             Anticoagulation Episode Summary     INR check location:       Preferred lab:   EXTERNAL LAB    Send INR reminders to:   LUIS EDUARDO MACK    Comments:   started home testing on 12/6/18.  Contact patient at [phone] until back in [place].      Anticoagulation Care Providers     Provider Role Specialty Phone number     Chavez Molina MD [facility] Internal Medicine [phone]            - - -

## 2022-04-16 NOTE — ED PROVIDER NOTE - PROGRESS NOTE DETAILS
symptoms resolved with GI cocktail. labs are unremarkable. ecg sinus tachycardia, otherwise no acute ischemic changes. cxr clear. will repeat trop and dispo pending result. Husain: asx. trop x 2 neg. dc with outpt cardio follow up- return if worsens

## 2022-04-16 NOTE — ED ADULT NURSE NOTE - NSIMPLEMENTINTERV_GEN_ALL_ED
Implemented All Fall Risk Interventions:  [place] to call system. Call bell, personal items and telephone within reach. Instruct patient to call for assistance. Room bathroom lighting operational. Non-slip footwear when patient is off stretcher. Physically safe environment: no spills, clutter or unnecessary equipment. Stretcher in lowest position, wheels locked, appropriate side rails in place. Provide visual cue, wrist band, yellow gown, etc. Monitor gait and stability. Monitor for mental status changes and reorient to person, place, and time. Review medications for side effects contributing to fall risk. Reinforce activity limits and safety measures with patient and family.

## 2022-10-11 NOTE — ED ADULT NURSE NOTE - CAS EDP DISCH DISPOSITION ADMI
October 11, 2022       Juan Ge MD  [address] [facility] [address]  Via In Basket      Patient: Nicole Dutta   YOB: 1941   Date of Visit: 10/11/2022       Dear Dr. Ge:    Thank you for referring Nicole Dutta to me for evaluation. Below are my notes for this visit with her.    If you have questions, please do not hesitate to call me. I look forward to following your patient along with you.      Sincerely,        Dorothy Macias MD        CC: No Recipients  Dorothy Macias MD  10/11/2022  2:43 PM  Signed         SLEEP EVALUATION NOTE     Nicole Dutta is a 80 year old female presenting for evaluation of: Office Visit and Consultation   .    Referring provider: Juan Ge MD  PCP: Juan Ge MD       HPI   The patient is a 80 years old female who presented to sleep clinic to address snoring, dyspneic arousal during sleep and daytime fatigue.  She reports episodic palpitations and underwent home sleep testing which revealed mild sleep apnea.  She is here today to discuss treatment options.    Bedtime: 11 pm  Awake time: 8 am  Sleep position: back  Sleep onset latency: 15 min  Sleeping aid medications: No  Awakenings # and episodes of nocturia: 2    [x]Snoring  []Witnessed apneas  [x]Dyspneic arousal  [x]Morning dry mouth  []Morning headache    [x]Non-restorative sleep  [x]Excessive daytime sleepiness or tired during the day  [x]Naps    []Urge to move legs and/or uncomfortable tingling or burning in legs  []Cramping or jerking of the legs during sleep    []Cataplexy  []Sleep paralysis  []Hypnogognic or hypnopompnic hallucinations  []Sleep attacks    []Parasomnias:    STOP BANG SCREENING  STOP  S: Do you snore loudly?: Yes  T: Do you often feel tired, fatigued or sleepy during daytime?: Yes  O: Has anyone observed you stop breathing during your sleep?: No  P: Do you have or are you being treated for high blood pressure?:  Yes  BANG  B: BMI more than 35 kg/m2?: No  A: Is age over 50 years old?: Yes  N: Neck circumfrence >16 inches (40 cm)?: No  G: Is gender Male?: No  Total Score  Stop Bang Total Score (out of 8): 4    PRIOR SLEEP STUDY DATE: [facility] 7/25/22  RESULTS: 1. AHI: 5.2                   2. DESATURATION JULI:84                 CURRENT CO-MORBIDITIES:    [x] HTN     [] AF/ARRHYTHMIA    [] HYPOTHYROIDISM     [] CAD     [] OBESITY                  [] INSOMNIA  [] CHF     [] DM                         [] STROKE/TIA  [x]OTHER:  Palpitations       [place] Sleepiness Scale:     0 = would never doze  1 = slight chance of dozing  2 = moderate chance of dozing  3 = high chance of dozing    Situation     Chance of Dozing       1. Sitting and reading   0    2. Watching TV    1  3. Sitting, inactive in a public place       (e.g. a theatre or a meeting)  0    4. As a passenger in a car for an hour       without a break    0    5. Lying down to rest in the afternoon      when circumstances permit  2  6. Sitting and talking to someone  0    7. Sitting quietly after lunch without      Alcohol     1  8. In a car, while stopped for a few      Minutes in traffic    0            TOTAL: 4      Past Medical History:     Past Medical History:   Diagnosis Date   • Anxiety    • Cataracts, bilateral    • Chronic idiopathic constipation    • Depression    • Dry eye syndrome    • Essential (primary) hypertension    • Gallstone 09/25/2019   • Gastroesophageal reflux disease    • High cholesterol    • Posterior vitreous detachment of left eye    • Thyroid disease      Past Surgical History:   Procedure Laterality Date   • Anus surgery      anal fistula   • Breast surgery      biopsy   • Cholecystectomy     • Esophagogastroduodenoscopy transoral flex w/bx single or mult  2021    hiatal hernia, gastritis   • Removal gallbladder       Family History   Problem Relation Age of Onset   • Patient is unaware of any medical problems Mother    • Patient is  unaware of any medical problems Father    • Hyperthyroid Sister      Social History     Tobacco Use   • Smoking status: Never Smoker   • Smokeless tobacco: Never Used   Vaping Use   • Vaping Use: never used   Substance Use Topics   • Alcohol use: No   • Drug use: No       ALLERGIES:   Allergen Reactions   • Amlodipine Other (See Comments)   • Cephalosporins Other (See Comments)   • Ciprofloxacin GI UPSET   • Clonidine Other (See Comments)   • Penicillins HIVES   • Sulfamethoxazole-Trimethoprim GI UPSET   • Azithromycin Other (See Comments)     Current Outpatient Medications   Medication Sig   • Diovan 320 MG tablet Take 1 tablet by mouth daily.   • omeprazole (PrilOSEC) 20 MG capsule Take 1 capsule by mouth daily (before breakfast). 30 minutes before breakfast.   • azelastine (ASTELIN) 0.1 % nasal spray 1 spray 2 times daily.   • pantoprazole (PROTONIX) 20 MG tablet TAKE 1 TABLET BY MOUTH IN THE MORNING AND 30 MINUTES BEFORE BREAKFAST   • simethicone (MYLICON) 80 MG chewable tablet Chew 80 mg by mouth.   • dicyclomine (BENTYL) 20 MG tablet Take 1 tablet by mouth 3 times daily.   • omeprazole (PriLOSEC) 40 MG capsule Take 1 capsule by mouth daily.   • carvedilol (COREG) 3.125 MG tablet Take 1 tablet by mouth 2 times daily (with meals).   • loratadine (Claritin) 10 MG tablet Take 1 tablet by mouth daily.   • ALPRAZolam (XANAX) 0.5 MG tablet Take 1 tablet by mouth every 8 hours.   • citalopram (CeleXA) 20 MG tablet Take 1 tablet by mouth daily.   • atorvastatin (LIPITOR) 20 MG tablet Take 1 tablet by mouth daily.   • methimazole (TAPAZOLE) 10 MG tablet Take 0.5 tablets by mouth daily.   • hydroCHLOROthiazide (HYDRODIURIL) 25 MG tablet Take 25 mg by mouth daily.   • fexofenadine (Allegra Allergy) 60 MG tablet Take 1 tablet by mouth daily.   • famotidine (PEPCID) 20 MG tablet Take 1 tablet by mouth daily before dinner.   • fluticasone (FLONASE) 50 MCG/ACT nasal spray Spray 2 sprays in each nostril daily.   • meclizine  (ANTIVERT) 25 MG tablet Take 25 mg by mouth.   • metroNIDAZOLE (METROGEL-VAGINAL) 0.75 % vaginal gel Place 1 applicator vaginally daily.     No current facility-administered medications for this visit.          Review of Systems:     Review of Systems   Constitutional: Negative.   HENT: Negative.    Eyes: Negative.    Cardiovascular: Negative.    Respiratory: Positive for snoring.    Endocrine: Negative.    Hematologic/Lymphatic: Negative.    Skin: Negative.    Musculoskeletal: Negative.    Gastrointestinal: Negative.    Neurological: Positive for excessive daytime sleepiness.   All other systems reviewed and are negative.       Physical Examination:     Vitals:    10/11/22 1416   BP: 97/56   Pulse: 75   Resp: 16   Temp: 97.2 °F (36.2 °C)   SpO2: 98%   Weight: 47.3 kg (104 lb 4.8 oz)   Height: 5' 4\" (1.626 m)     Body mass index is 17.9 kg/m².      Neck Circumference: 12 in  Nasal Passages: [x]Clear   [] Congested  Palate: [place] [] I  [x] II   [] III   [] IV              [place]/edema [x]none []+1  []+2  []+3  []+4    Gen: No acute distress. Pleasant and interactive.  Head:  Normocephalic and atraumatic.  Eyes: Conjunctiva and sclera normal.   Heart:  Normal rate and regular rhythm, no murmur.  Lungs:  Normal respiratory rate and effort, breath sounds equal.  Extremities:  No edema in lower extremities.   Skin: Warm and dry, no rash.  Musculoskeletal:  No joint swelling or tenderness  Psych:  Affect was appropriate to situation and mood was normal.  Neuro:  Alert and oriented, attention and recall preserved, cranial nerves intact, motor strength preserved, coordination intact, gait normal.     Assessment and Plan:     PROBLEMS ADDRESSED THIS VISIT:  Problem List Items Addressed This Visit        Cardiac and Vasculature    Palpitations    Essential hypertension      Other Visit Diagnoses     Mild obstructive sleep apnea    -  Primary             PLAN & Patient Counseling:     • We reviewed the nature of sleep  apnea, the elevated cardiovascular risks and other health consequences associated with this condition and reviewed treatment options in detail.  • We discussed sleep study results in details and provided patient with a copy of the report.  • Pt to undergo specialty dentist evaluation for oral appliance trial.  • The patient was cautioned not to drive while sleepy.       Telemetry

## 2022-12-09 NOTE — PATIENT PROFILE ADULT. - HEALTH/HEALTHCARE ANXIETIES, PROFILE
Post-Op Assessment Note    CV Status:  Stable    Pain management: adequate     Mental Status:  Sleepy   Hydration Status:  Euvolemic   PONV Controlled:  Controlled   Airway Patency:  Patent      Post Op Vitals Reviewed: Yes      Staff: Anesthesiologist, CRNA         No notable events documented      BP   107/59   Temp      Pulse  81   Resp   10   SpO2   93 no

## 2023-05-14 NOTE — PHYSICAL THERAPY INITIAL EVALUATION ADULT - ASR WT BEARING STATUS EVAL
no weight-bearing restrictions FAMILY HISTORY:  No pertinent family history in first degree relatives

## 2023-06-22 ENCOUNTER — NON-APPOINTMENT (OUTPATIENT)
Age: 73
End: 2023-06-22

## 2023-07-07 ENCOUNTER — NON-APPOINTMENT (OUTPATIENT)
Age: 73
End: 2023-07-07

## 2023-07-25 ENCOUNTER — NON-APPOINTMENT (OUTPATIENT)
Age: 73
End: 2023-07-25

## 2023-12-29 ENCOUNTER — INPATIENT (INPATIENT)
Facility: HOSPITAL | Age: 73
LOS: 0 days | Discharge: HOME | DRG: 309 | End: 2023-12-30
Attending: STUDENT IN AN ORGANIZED HEALTH CARE EDUCATION/TRAINING PROGRAM | Admitting: STUDENT IN AN ORGANIZED HEALTH CARE EDUCATION/TRAINING PROGRAM
Payer: MEDICARE

## 2023-12-29 VITALS
SYSTOLIC BLOOD PRESSURE: 171 MMHG | HEART RATE: 118 BPM | DIASTOLIC BLOOD PRESSURE: 95 MMHG | RESPIRATION RATE: 18 BRPM | WEIGHT: 282.19 LBS | HEIGHT: 70 IN | OXYGEN SATURATION: 100 %

## 2023-12-29 DIAGNOSIS — I47.10 SUPRAVENTRICULAR TACHYCARDIA, UNSPECIFIED: ICD-10-CM

## 2023-12-29 DIAGNOSIS — Z95.5 PRESENCE OF CORONARY ANGIOPLASTY IMPLANT AND GRAFT: Chronic | ICD-10-CM

## 2023-12-29 DIAGNOSIS — R07.9 CHEST PAIN, UNSPECIFIED: ICD-10-CM

## 2023-12-29 DIAGNOSIS — N40.0 BENIGN PROSTATIC HYPERPLASIA WITHOUT LOWER URINARY TRACT SYMPTOMS: ICD-10-CM

## 2023-12-29 DIAGNOSIS — E11.9 TYPE 2 DIABETES MELLITUS WITHOUT COMPLICATIONS: ICD-10-CM

## 2023-12-29 DIAGNOSIS — Z29.9 ENCOUNTER FOR PROPHYLACTIC MEASURES, UNSPECIFIED: ICD-10-CM

## 2023-12-29 DIAGNOSIS — I10 ESSENTIAL (PRIMARY) HYPERTENSION: ICD-10-CM

## 2023-12-29 DIAGNOSIS — I24.9 ACUTE ISCHEMIC HEART DISEASE, UNSPECIFIED: ICD-10-CM

## 2023-12-29 DIAGNOSIS — E78.5 HYPERLIPIDEMIA, UNSPECIFIED: ICD-10-CM

## 2023-12-29 LAB
ALBUMIN SERPL ELPH-MCNC: 3.4 G/DL — LOW (ref 3.5–5)
ALBUMIN SERPL ELPH-MCNC: 3.4 G/DL — LOW (ref 3.5–5)
ALP SERPL-CCNC: 97 U/L — SIGNIFICANT CHANGE UP (ref 40–120)
ALP SERPL-CCNC: 97 U/L — SIGNIFICANT CHANGE UP (ref 40–120)
ALT FLD-CCNC: 19 U/L DA — SIGNIFICANT CHANGE UP (ref 10–60)
ALT FLD-CCNC: 19 U/L DA — SIGNIFICANT CHANGE UP (ref 10–60)
ANION GAP SERPL CALC-SCNC: 7 MMOL/L — SIGNIFICANT CHANGE UP (ref 5–17)
ANION GAP SERPL CALC-SCNC: 7 MMOL/L — SIGNIFICANT CHANGE UP (ref 5–17)
AST SERPL-CCNC: 26 U/L — SIGNIFICANT CHANGE UP (ref 10–40)
AST SERPL-CCNC: 26 U/L — SIGNIFICANT CHANGE UP (ref 10–40)
BASOPHILS # BLD AUTO: 0.02 K/UL — SIGNIFICANT CHANGE UP (ref 0–0.2)
BASOPHILS # BLD AUTO: 0.02 K/UL — SIGNIFICANT CHANGE UP (ref 0–0.2)
BASOPHILS NFR BLD AUTO: 0.4 % — SIGNIFICANT CHANGE UP (ref 0–2)
BASOPHILS NFR BLD AUTO: 0.4 % — SIGNIFICANT CHANGE UP (ref 0–2)
BILIRUB SERPL-MCNC: 0.4 MG/DL — SIGNIFICANT CHANGE UP (ref 0.2–1.2)
BILIRUB SERPL-MCNC: 0.4 MG/DL — SIGNIFICANT CHANGE UP (ref 0.2–1.2)
BUN SERPL-MCNC: 11 MG/DL — SIGNIFICANT CHANGE UP (ref 7–18)
BUN SERPL-MCNC: 11 MG/DL — SIGNIFICANT CHANGE UP (ref 7–18)
CALCIUM SERPL-MCNC: 8.7 MG/DL — SIGNIFICANT CHANGE UP (ref 8.4–10.5)
CALCIUM SERPL-MCNC: 8.7 MG/DL — SIGNIFICANT CHANGE UP (ref 8.4–10.5)
CHLORIDE SERPL-SCNC: 106 MMOL/L — SIGNIFICANT CHANGE UP (ref 96–108)
CHLORIDE SERPL-SCNC: 106 MMOL/L — SIGNIFICANT CHANGE UP (ref 96–108)
CK MB BLD-MCNC: 3.9 % — HIGH (ref 0–3.5)
CK MB BLD-MCNC: 3.9 % — HIGH (ref 0–3.5)
CK MB CFR SERPL CALC: 3.8 NG/ML — HIGH (ref 0–3.6)
CK MB CFR SERPL CALC: 3.8 NG/ML — HIGH (ref 0–3.6)
CK SERPL-CCNC: 97 U/L — SIGNIFICANT CHANGE UP (ref 35–232)
CK SERPL-CCNC: 97 U/L — SIGNIFICANT CHANGE UP (ref 35–232)
CO2 SERPL-SCNC: 28 MMOL/L — SIGNIFICANT CHANGE UP (ref 22–31)
CO2 SERPL-SCNC: 28 MMOL/L — SIGNIFICANT CHANGE UP (ref 22–31)
CREAT SERPL-MCNC: 1.02 MG/DL — SIGNIFICANT CHANGE UP (ref 0.5–1.3)
CREAT SERPL-MCNC: 1.02 MG/DL — SIGNIFICANT CHANGE UP (ref 0.5–1.3)
EGFR: 78 ML/MIN/1.73M2 — SIGNIFICANT CHANGE UP
EGFR: 78 ML/MIN/1.73M2 — SIGNIFICANT CHANGE UP
EOSINOPHIL # BLD AUTO: 0.03 K/UL — SIGNIFICANT CHANGE UP (ref 0–0.5)
EOSINOPHIL # BLD AUTO: 0.03 K/UL — SIGNIFICANT CHANGE UP (ref 0–0.5)
EOSINOPHIL NFR BLD AUTO: 0.6 % — SIGNIFICANT CHANGE UP (ref 0–6)
EOSINOPHIL NFR BLD AUTO: 0.6 % — SIGNIFICANT CHANGE UP (ref 0–6)
GAS PNL BLDV: SIGNIFICANT CHANGE UP
GAS PNL BLDV: SIGNIFICANT CHANGE UP
GLUCOSE BLDC GLUCOMTR-MCNC: 151 MG/DL — HIGH (ref 70–99)
GLUCOSE BLDC GLUCOMTR-MCNC: 151 MG/DL — HIGH (ref 70–99)
GLUCOSE SERPL-MCNC: 111 MG/DL — HIGH (ref 70–99)
GLUCOSE SERPL-MCNC: 111 MG/DL — HIGH (ref 70–99)
HCT VFR BLD CALC: 40.8 % — SIGNIFICANT CHANGE UP (ref 39–50)
HCT VFR BLD CALC: 40.8 % — SIGNIFICANT CHANGE UP (ref 39–50)
HGB BLD-MCNC: 13 G/DL — SIGNIFICANT CHANGE UP (ref 13–17)
HGB BLD-MCNC: 13 G/DL — SIGNIFICANT CHANGE UP (ref 13–17)
IMM GRANULOCYTES NFR BLD AUTO: 0.2 % — SIGNIFICANT CHANGE UP (ref 0–0.9)
IMM GRANULOCYTES NFR BLD AUTO: 0.2 % — SIGNIFICANT CHANGE UP (ref 0–0.9)
LYMPHOCYTES # BLD AUTO: 1.89 K/UL — SIGNIFICANT CHANGE UP (ref 1–3.3)
LYMPHOCYTES # BLD AUTO: 1.89 K/UL — SIGNIFICANT CHANGE UP (ref 1–3.3)
LYMPHOCYTES # BLD AUTO: 38.3 % — SIGNIFICANT CHANGE UP (ref 13–44)
LYMPHOCYTES # BLD AUTO: 38.3 % — SIGNIFICANT CHANGE UP (ref 13–44)
MAGNESIUM SERPL-MCNC: 1.6 MG/DL — SIGNIFICANT CHANGE UP (ref 1.6–2.6)
MAGNESIUM SERPL-MCNC: 1.6 MG/DL — SIGNIFICANT CHANGE UP (ref 1.6–2.6)
MCHC RBC-ENTMCNC: 31.6 PG — SIGNIFICANT CHANGE UP (ref 27–34)
MCHC RBC-ENTMCNC: 31.6 PG — SIGNIFICANT CHANGE UP (ref 27–34)
MCHC RBC-ENTMCNC: 31.9 GM/DL — LOW (ref 32–36)
MCHC RBC-ENTMCNC: 31.9 GM/DL — LOW (ref 32–36)
MCV RBC AUTO: 99 FL — SIGNIFICANT CHANGE UP (ref 80–100)
MCV RBC AUTO: 99 FL — SIGNIFICANT CHANGE UP (ref 80–100)
MONOCYTES # BLD AUTO: 0.46 K/UL — SIGNIFICANT CHANGE UP (ref 0–0.9)
MONOCYTES # BLD AUTO: 0.46 K/UL — SIGNIFICANT CHANGE UP (ref 0–0.9)
MONOCYTES NFR BLD AUTO: 9.3 % — SIGNIFICANT CHANGE UP (ref 2–14)
MONOCYTES NFR BLD AUTO: 9.3 % — SIGNIFICANT CHANGE UP (ref 2–14)
NEUTROPHILS # BLD AUTO: 2.53 K/UL — SIGNIFICANT CHANGE UP (ref 1.8–7.4)
NEUTROPHILS # BLD AUTO: 2.53 K/UL — SIGNIFICANT CHANGE UP (ref 1.8–7.4)
NEUTROPHILS NFR BLD AUTO: 51.2 % — SIGNIFICANT CHANGE UP (ref 43–77)
NEUTROPHILS NFR BLD AUTO: 51.2 % — SIGNIFICANT CHANGE UP (ref 43–77)
NRBC # BLD: 0 /100 WBCS — SIGNIFICANT CHANGE UP (ref 0–0)
NRBC # BLD: 0 /100 WBCS — SIGNIFICANT CHANGE UP (ref 0–0)
NT-PROBNP SERPL-SCNC: 231 PG/ML — HIGH (ref 0–125)
NT-PROBNP SERPL-SCNC: 231 PG/ML — HIGH (ref 0–125)
PLATELET # BLD AUTO: 122 K/UL — LOW (ref 150–400)
PLATELET # BLD AUTO: 122 K/UL — LOW (ref 150–400)
POTASSIUM SERPL-MCNC: 3.8 MMOL/L — SIGNIFICANT CHANGE UP (ref 3.5–5.3)
POTASSIUM SERPL-MCNC: 3.8 MMOL/L — SIGNIFICANT CHANGE UP (ref 3.5–5.3)
POTASSIUM SERPL-SCNC: 3.8 MMOL/L — SIGNIFICANT CHANGE UP (ref 3.5–5.3)
POTASSIUM SERPL-SCNC: 3.8 MMOL/L — SIGNIFICANT CHANGE UP (ref 3.5–5.3)
PROT SERPL-MCNC: 8.6 G/DL — HIGH (ref 6–8.3)
PROT SERPL-MCNC: 8.6 G/DL — HIGH (ref 6–8.3)
RBC # BLD: 4.12 M/UL — LOW (ref 4.2–5.8)
RBC # BLD: 4.12 M/UL — LOW (ref 4.2–5.8)
RBC # FLD: 13.1 % — SIGNIFICANT CHANGE UP (ref 10.3–14.5)
RBC # FLD: 13.1 % — SIGNIFICANT CHANGE UP (ref 10.3–14.5)
SODIUM SERPL-SCNC: 141 MMOL/L — SIGNIFICANT CHANGE UP (ref 135–145)
SODIUM SERPL-SCNC: 141 MMOL/L — SIGNIFICANT CHANGE UP (ref 135–145)
TROPONIN I, HIGH SENSITIVITY RESULT: 1422.3 NG/L — HIGH
TROPONIN I, HIGH SENSITIVITY RESULT: 1422.3 NG/L — HIGH
TROPONIN I, HIGH SENSITIVITY RESULT: 29.2 NG/L — SIGNIFICANT CHANGE UP
TROPONIN I, HIGH SENSITIVITY RESULT: 29.2 NG/L — SIGNIFICANT CHANGE UP
TSH SERPL-MCNC: 2.41 UU/ML — SIGNIFICANT CHANGE UP (ref 0.34–4.82)
TSH SERPL-MCNC: 2.41 UU/ML — SIGNIFICANT CHANGE UP (ref 0.34–4.82)
WBC # BLD: 4.94 K/UL — SIGNIFICANT CHANGE UP (ref 3.8–10.5)
WBC # BLD: 4.94 K/UL — SIGNIFICANT CHANGE UP (ref 3.8–10.5)
WBC # FLD AUTO: 4.94 K/UL — SIGNIFICANT CHANGE UP (ref 3.8–10.5)
WBC # FLD AUTO: 4.94 K/UL — SIGNIFICANT CHANGE UP (ref 3.8–10.5)

## 2023-12-29 PROCEDURE — 71045 X-RAY EXAM CHEST 1 VIEW: CPT | Mod: 26

## 2023-12-29 PROCEDURE — 99285 EMERGENCY DEPT VISIT HI MDM: CPT

## 2023-12-29 RX ORDER — NIFEDIPINE 30 MG
60 TABLET, EXTENDED RELEASE 24 HR ORAL DAILY
Refills: 0 | Status: DISCONTINUED | OUTPATIENT
Start: 2023-12-29 | End: 2023-12-30

## 2023-12-29 RX ORDER — PANTOPRAZOLE SODIUM 20 MG/1
40 TABLET, DELAYED RELEASE ORAL
Refills: 0 | Status: DISCONTINUED | OUTPATIENT
Start: 2023-12-29 | End: 2023-12-30

## 2023-12-29 RX ORDER — ALBUTEROL 90 UG/1
2 AEROSOL, METERED ORAL EVERY 4 HOURS
Refills: 0 | Status: DISCONTINUED | OUTPATIENT
Start: 2023-12-29 | End: 2023-12-30

## 2023-12-29 RX ORDER — TAMSULOSIN HYDROCHLORIDE 0.4 MG/1
0.4 CAPSULE ORAL AT BEDTIME
Refills: 0 | Status: DISCONTINUED | OUTPATIENT
Start: 2023-12-29 | End: 2023-12-30

## 2023-12-29 RX ORDER — INSULIN LISPRO 100/ML
VIAL (ML) SUBCUTANEOUS
Refills: 0 | Status: DISCONTINUED | OUTPATIENT
Start: 2023-12-29 | End: 2023-12-30

## 2023-12-29 RX ORDER — INSULIN GLARGINE 100 [IU]/ML
40 INJECTION, SOLUTION SUBCUTANEOUS AT BEDTIME
Refills: 0 | Status: DISCONTINUED | OUTPATIENT
Start: 2023-12-29 | End: 2023-12-30

## 2023-12-29 RX ORDER — METOPROLOL TARTRATE 50 MG
100 TABLET ORAL ONCE
Refills: 0 | Status: COMPLETED | OUTPATIENT
Start: 2023-12-29 | End: 2023-12-29

## 2023-12-29 RX ORDER — PANTOPRAZOLE SODIUM 20 MG/1
40 TABLET, DELAYED RELEASE ORAL ONCE
Refills: 0 | Status: COMPLETED | OUTPATIENT
Start: 2023-12-29 | End: 2023-12-29

## 2023-12-29 RX ORDER — FINASTERIDE 5 MG/1
5 TABLET, FILM COATED ORAL DAILY
Refills: 0 | Status: DISCONTINUED | OUTPATIENT
Start: 2023-12-29 | End: 2023-12-30

## 2023-12-29 RX ORDER — LOSARTAN POTASSIUM 100 MG/1
100 TABLET, FILM COATED ORAL DAILY
Refills: 0 | Status: DISCONTINUED | OUTPATIENT
Start: 2023-12-29 | End: 2023-12-30

## 2023-12-29 RX ORDER — ASPIRIN/CALCIUM CARB/MAGNESIUM 324 MG
81 TABLET ORAL DAILY
Refills: 0 | Status: DISCONTINUED | OUTPATIENT
Start: 2023-12-30 | End: 2023-12-30

## 2023-12-29 RX ORDER — INSULIN LISPRO 100/ML
VIAL (ML) SUBCUTANEOUS AT BEDTIME
Refills: 0 | Status: DISCONTINUED | OUTPATIENT
Start: 2023-12-29 | End: 2023-12-30

## 2023-12-29 RX ORDER — ATORVASTATIN CALCIUM 80 MG/1
80 TABLET, FILM COATED ORAL AT BEDTIME
Refills: 0 | Status: DISCONTINUED | OUTPATIENT
Start: 2023-12-29 | End: 2023-12-30

## 2023-12-29 RX ORDER — CYCLOBENZAPRINE HYDROCHLORIDE 10 MG/1
10 TABLET, FILM COATED ORAL THREE TIMES A DAY
Refills: 0 | Status: DISCONTINUED | OUTPATIENT
Start: 2023-12-29 | End: 2023-12-30

## 2023-12-29 RX ORDER — CELECOXIB 200 MG/1
50 CAPSULE ORAL DAILY
Refills: 0 | Status: DISCONTINUED | OUTPATIENT
Start: 2023-12-29 | End: 2023-12-29

## 2023-12-29 RX ORDER — RIVAROXABAN 15 MG-20MG
20 KIT ORAL ONCE
Refills: 0 | Status: COMPLETED | OUTPATIENT
Start: 2023-12-29 | End: 2023-12-29

## 2023-12-29 RX ORDER — ISOSORBIDE MONONITRATE 60 MG/1
30 TABLET, EXTENDED RELEASE ORAL DAILY
Refills: 0 | Status: DISCONTINUED | OUTPATIENT
Start: 2023-12-30 | End: 2023-12-30

## 2023-12-29 RX ORDER — METOPROLOL TARTRATE 50 MG
50 TABLET ORAL
Refills: 0 | Status: DISCONTINUED | OUTPATIENT
Start: 2023-12-29 | End: 2023-12-30

## 2023-12-29 RX ORDER — RIVAROXABAN 15 MG-20MG
20 KIT ORAL
Refills: 0 | Status: DISCONTINUED | OUTPATIENT
Start: 2023-12-30 | End: 2023-12-30

## 2023-12-29 RX ORDER — DESMOPRESSIN ACETATE 0.1 MG/1
0.2 TABLET ORAL DAILY
Refills: 0 | Status: DISCONTINUED | OUTPATIENT
Start: 2023-12-29 | End: 2023-12-30

## 2023-12-29 RX ADMIN — PANTOPRAZOLE SODIUM 40 MILLIGRAM(S): 20 TABLET, DELAYED RELEASE ORAL at 22:07

## 2023-12-29 RX ADMIN — Medication 50 MILLIGRAM(S): at 21:43

## 2023-12-29 RX ADMIN — INSULIN GLARGINE 40 UNIT(S): 100 INJECTION, SOLUTION SUBCUTANEOUS at 21:44

## 2023-12-29 RX ADMIN — RIVAROXABAN 20 MILLIGRAM(S): KIT at 13:46

## 2023-12-29 RX ADMIN — Medication 100 MILLIGRAM(S): at 13:45

## 2023-12-29 RX ADMIN — TAMSULOSIN HYDROCHLORIDE 0.4 MILLIGRAM(S): 0.4 CAPSULE ORAL at 21:43

## 2023-12-29 RX ADMIN — ATORVASTATIN CALCIUM 80 MILLIGRAM(S): 80 TABLET, FILM COATED ORAL at 21:43

## 2023-12-29 NOTE — PHARMACOTHERAPY INTERVENTION NOTE - COMMENTS
Medication reconciliation done with list provided by patient (tim on patient's phone); the outpatient medication review was updated.

## 2023-12-29 NOTE — H&P ADULT - ATTENDING COMMENTS
Mr. Viera is a 72 y/o male from home, w/ PMH of HTN, HLD, CAD s/ps stents, chronic lymphedema w/ venous insufficiency ulcers, DM, asthma, BPH and gastritis p/w chest pressure and palpitation x 1 day. Pt reports that he was doing dishes this afternoon when he suddenly felt chest pressure associated w/ palpitations, states that "he could feel his heart racing"   He denies nausea, vomiting, abdominal pain, cough or any other symptoms. he does have some leg swelling which is chronic. Per EMS reports, patient was in SVT- HR ranging in 180's. He was given aspirin 325mg x1 followed by nitroglycerin x3 and placed on Bi-PAP for inc WOB.   Patient is on Xarelto and metoprolol at home but denies any hx of Afib or flutter. He reports that he was given Xarelto after a hospitalization for Covid- denies hx of PE/DVT and is unsure why he is taking it.     Upon arrival to ED, patient's VS showed , /95, RR 18, SPO2 100% on Bi-pap. He felt much better and was transitioned to RA.     Labs reviewed- cbc, bmp, CE, pro-bnp     PE as above     A/P:  #SVT- now in NSR   #Chest pain  #Uncontrolled HTN  #CAD s/p stents   #Lymphedema   #DM  #Asthma   #BPH  #DVT ppx     Plan:  -Patient p/w chest pain and palpitations, noted to be in SVT- broke spontaneously to NSR. EKG- Sinus tachycardia @108.  CE wnl x1.   -Chest pain could be related to SVT, no s/s of volume overload on exam   -s/p Metoprolol and Xarelto in ED (home medications), patient reports prior hospitalization for similar complaints but denies hx of Afib/aflutter- unsure if he had any arrythmia diagnosed.   -Resume Xarelto, metoprolol, c/w tele. cardiology evaluation   -he had recent ECHO and stress test in Oct'2023 which was grossly unremarkable, except grade 1 DD and evidence of old infarct on stress w/ no reversible ischemia. (reports present in patient's My-chart tim)  He follows w/ Dr. Fiona Merrill- out-patient cardiologist and has f/u appt on 01/02/2024   -Resume asa, statin and Imdur   -Resume BP meds- takes nifedipine and losartan   -Resume tamsulosin and finasteride  -C/w DDAVP- home med for nocturia. Follows w/ urologist as out-patient

## 2023-12-29 NOTE — H&P ADULT - NSHPREVIEWOFSYSTEMS_GEN_ALL_CORE
REVIEW OF SYSTEMS:    CONSTITUTIONAL: No weakness, fevers or chills  EYES/ENT: No visual changes;  No vertigo or throat pain   NECK: No pain or stiffness  RESPIRATORY: No cough, wheezing, hemoptysis; No shortness of breath  CARDIOVASCULAR: + chest pain and palpitations  GASTROINTESTINAL: No abdominal or epigastric pain. No nausea, vomiting, or hematemesis; No diarrhea or constipation. No melena or hematochezia.  GENITOURINARY: No dysuria, frequency or hematuria  NEUROLOGICAL: No numbness or weakness  SKIN: No itching, rashes

## 2023-12-29 NOTE — H&P ADULT - PROBLEM SELECTOR PLAN 3
hx of HTN on Losartan 100 mg, Metoprolol succ 100 mg, Nifedipine XL 60, HCTZ 25 mg   c/w home meds with holding parameters hx of DM on Lantus 40 units, Metformin 1000 mg BID  c/w Lantus 40 units and mod ISS   c/w Dash/ DM diet

## 2023-12-29 NOTE — CHART NOTE - NSCHARTNOTEFT_GEN_A_CORE
EVENT: Troponin I, High Sensitivity Result: 1422.3:  ng/L (12.29.23 @ 20:30)  Troponin I, High Sensitivity Result: 29.2: ng/L (12.29.23 @ 12:45)    BRIEF HPI:73 yrs old M, from home, ambulating with rollator, HHA twice weekly, pmhx of CAD s/p 4 stent s about 3 yrs ago, ?Afib, HTN, DM, HLD, Asthma, pshx bariatric sx, presented with chest pressure. Pt is admitted for ACS     OBJECTIVE:  Vital Signs Last 24 Hrs  T(C): 37.1 (29 Dec 2023 20:30), Max: 37.1 (29 Dec 2023 20:30)  T(F): 98.8 (29 Dec 2023 20:30), Max: 98.8 (29 Dec 2023 20:30)  HR: 99 (29 Dec 2023 20:30) (89 - 118)  BP: 166/76 (29 Dec 2023 20:30) (151/89 - 171/95)  BP(mean): 106 (29 Dec 2023 14:14) (106 - 106)  RR: 18 (29 Dec 2023 20:30) (18 - 18)  SpO2: 98% (29 Dec 2023 20:30) (98% - 100%)    Parameters below as of 29 Dec 2023 20:30  Patient On (Oxygen Delivery Method): room air        FOCUSED PHYSICAL EXAM:    LABS:                        13.0   4.94  )-----------( 122      ( 29 Dec 2023 12:45 )             40.8   CARDIAC MARKERS ( 29 Dec 2023 20:30 )  x     / x     / 97 U/L / x     / 3.8 ng/mL    12-29    141  |  106  |  11  ----------------------------<  111<H>  3.8   |  28  |  1.02    Ca    8.7      29 Dec 2023 12:45  Mg     1.6     12-29    TPro  8.6<H>  /  Alb  3.4<L>  /  TBili  0.4  /  DBili  x   /  AST  26  /  ALT  19  /  AlkPhos  97  12-29      EKG:   IMGAGING:    ASSESSMENT:  HPI:  73 yrs old M, from home, ambulating with rollator, HHA twice weekly, pmhx of  CAD s/p 4 stent s about 3 yrs ago, ?Afib, HTN, DM, HLD, Asthma, pshx bariatric sx, presented with chest pressure. Pt stated that chest pressure was central without any radiation, with palpitation that lasted about 30 minutes and was resolved by the time pt was at the hospital. Denied sweating, dizziness, blurring of vision, dyspnea, orthopnea, abdominal pain, N/V/D, dysuria, weakness or decreased sensation in the extremities. He has been having chronic LE swelling which he attributes to chronic venous insufficiency, which he usually either wraps or uses stockings. He stated that he had similar chest pain in the past on and off, endorsed having a stress test and Echo done which were normal. He reported of snoring at nights, however was never dx or assessed for TREVON.   Pt was provided with 324 mg of aspirin, sublingual nitro, placed on CPAP for increased work of breathing and was noted to be in SVT with HR 180s in ED.   Pt consumes alcohol occasionally and smokes marijuana about 5-6 joints daily, denied tobacco smoking, or use of illicit drugs.   (29 Dec 2023 16:43)      PLAN:     FOLLOW UP / RESULT: EVENT: Troponin I, High Sensitivity Result: 1422.3:  ng/L (.23 @ 20:30)  Troponin I, High Sensitivity Result: 29.2: ng/L (..23 @ 12:45)    BRIEF HPI:73 yrs old M, from home, ambulating with rollator, HHA twice weekly, pmhx of CAD s/p 4 stent s about 3 yrs ago, ?Afib, HTN, DM, HLD, Asthma, pshx bariatric sx, presented with chest pressure. Pt is admitted for ACS. Now trops elevated.    OBJECTIVE:  Vital Signs Last 24 Hrs  T(C): 37.1 (29 Dec 2023 20:30), Max: 37.1 (29 Dec 2023 20:30)  T(F): 98.8 (29 Dec 2023 20:30), Max: 98.8 (29 Dec 2023 20:30)  HR: 99 (29 Dec 2023 20:30) (89 - 118)  BP: 166/76 (29 Dec 2023 20:30) (151/89 - 171/95)  BP(mean): 106 (29 Dec 2023 14:14) (106 - 106)  RR: 18 (29 Dec 2023 20:30) (18 - 18)  SpO2: 98% (29 Dec 2023 20:30) (98% - 100%)    Parameters below as of 29 Dec 2023 20:30  Patient On (Oxygen Delivery Method): room air    FOCUSED PHYSICAL EXAM:  CV: S1 S2 regular, denies chest pain  RESP: Even. unlabored on room air  NEURO: Alert, oriented X 4    LABS:                        13.0   4.94  )-----------( 122      ( 29 Dec 2023 12:45 )             40.8   CARDIAC MARKERS ( 29 Dec 2023 20:30 )  x     / x     / 97 U/L / x     / 3.8 ng/mL        141  |  106  |  11  ----------------------------<  111<H>  3.8   |  28  |  1.02    Ca    8.7      29 Dec 2023 12:45  Mg     1.6         TPro  8.6<H>  /  Alb  3.4<L>  /  TBili  0.4  /  DBili  x   /  AST  26  /  ALT  19  /  AlkPhos  97  -    EK23 21:58  NSR  Vent rate 96 bpm  WA interval 174 ms  QRS duration 88 ms  QT/QTc 366/462 ms  Inferior infarct age indeterminate (not appreciated in 2, 3, AVF)    ROBLEM: Nstemi probably to demand  ischemia  PLAN:   EKG now  Continue cardiac monitoring  Trop at midnight  Tele bed pending    FOLLOW UP / RESULT: repeat trop EVENT: Troponin I, High Sensitivity Result: 1422.3:  ng/L (.23 @ 20:30)  Troponin I, High Sensitivity Result: 29.2: ng/L (..23 @ 12:45)    BRIEF HPI:73 yrs old M, from home, ambulating with rollator, HHA twice weekly, pmhx of CAD s/p 4 stent s about 3 yrs ago, ?Afib, HTN, DM, HLD, Asthma, pshx bariatric sx, presented with chest pressure. Pt is admitted for ACS. Now trops elevated.    OBJECTIVE:  Vital Signs Last 24 Hrs  T(C): 37.1 (29 Dec 2023 20:30), Max: 37.1 (29 Dec 2023 20:30)  T(F): 98.8 (29 Dec 2023 20:30), Max: 98.8 (29 Dec 2023 20:30)  HR: 99 (29 Dec 2023 20:30) (89 - 118)  BP: 166/76 (29 Dec 2023 20:30) (151/89 - 171/95)  BP(mean): 106 (29 Dec 2023 14:14) (106 - 106)  RR: 18 (29 Dec 2023 20:30) (18 - 18)  SpO2: 98% (29 Dec 2023 20:30) (98% - 100%)    Parameters below as of 29 Dec 2023 20:30  Patient On (Oxygen Delivery Method): room air    FOCUSED PHYSICAL EXAM:  CV: S1 S2 regular, denies chest pain  RESP: Even. unlabored on room air  NEURO: Alert, oriented X 4    LABS:                        13.0   4.94  )-----------( 122      ( 29 Dec 2023 12:45 )             40.8   CARDIAC MARKERS ( 29 Dec 2023 20:30 )  x     / x     / 97 U/L / x     / 3.8 ng/mL        141  |  106  |  11  ----------------------------<  111<H>  3.8   |  28  |  1.02    Ca    8.7      29 Dec 2023 12:45  Mg     1.6         TPro  8.6<H>  /  Alb  3.4<L>  /  TBili  0.4  /  DBili  x   /  AST  26  /  ALT  19  /  AlkPhos  97  -    EK23 21:58  NSR  Vent rate 96 bpm  SD interval 174 ms  QRS duration 88 ms  QT/QTc 366/462 ms  Inferior infarct age indeterminate (not appreciated in 2, 3, AVF)    ROBLEM: Nstemi probably to demand  ischemia  PLAN:   EKG now  Continue cardiac monitoring  Trop at midnight  Tele bed pending    FOLLOW UP / RESULT: repeat trop

## 2023-12-29 NOTE — H&P ADULT - PROBLEM SELECTOR PLAN 6
DVT- c/w Xarelto [place] meds DVT- c/w Xarelto [place] meds hx of BPH on Tamsulosin and finasteride and DDAVP for nocturia   c/w home meds

## 2023-12-29 NOTE — H&P ADULT - BIRTH SEX
[facility] ED PT note:  Orders placed for PT mobility assessment.  Pt presented to the emergency department with reports of increased low back pain.  Pt had a fall last Friday and noticed coccyx pain at that time.  Pt states she was able to tolerate that pain.  Pt proceeded to complete a lot of house work yesterday which caused severe low back pain.  Upon PT attempting evaluation, pt is informed that she will be admitted.  Pt states she would like to still be evaluated by PT.  However, once pt initiated bed mobility, she stated her pain was too much and requested to hold PT eval until tomorrow morning.  Will continue PT efforts.   Male

## 2023-12-29 NOTE — H&P ADULT - PROBLEM SELECTOR PLAN 1
p/w chest pain and palpitation - SVT with HR 180s   in ED: afebrile, , /95, increased work of breathing started on BiPAP then off BiPAP sat 100% on RA   s/p ASA and Nitro   EKG: Sinus tachycardia  c/w home med Metoprolol 50 mg BID p/w chest pain and palpitation - SVT with HR 180s   in ED: afebrile, , /95, increased work of breathing started on BiPAP then off BiPAP sat 100% on RA   s/p ASA and Nitro   EKG: Sinus tachycardia  c/w home med Metoprolol 50 mg BID  c/w Xarelto home meds although pt denied hx of afib

## 2023-12-29 NOTE — ED ADULT NURSE NOTE - CHPI ED NUR DURATION
Daily Note     Today's date: 2019  Patient name: Juancarlos Valle  : 2003  MRN: [id number]  Referring provider: Karen Kirby MD  Dx:   Encounter Diagnosis     ICD-10-CM    1  Right ankle pain, unspecified chronicity M25 571                   Subjective: Pt reports that she swam 2000 meters over the weekend and her ankles felt pretty good  Objective: See treatment diary below      Assessment: Tolerated treatment well  Patient exhibited good technique with therapeutic exercises and would benefit from continued PT  Pt continues to progress with strength and endurance  Plan: Continue per plan of care        Precautions: s/p removal of B Steida process      Manual  10/24 10/28 11/1 11/5 11/7 11/11 11/15 11/19     PROM HK KO HK KO KO NR KO KO                                                             Exercise Diary  10/24 10/28 11/1 11/5 11/7 11/11 11/15 11/19     Bike NV 10 min 10' 10' 10' 10' 5 min 10 min     Ankle alphabet x2 x3 HEP          Ankle DF/PF  and IV/EV x30 x30 HEP          Ankle circles 2X x30 HEP          Ankle t-band   R OR  L Blue  3x10 R OR  L Blue  3x12 R OR  L Blue  3x12 R OR  L Purp  3x15 R OR  L Blue  45x R OR  L Purp  50x     HR   3x10 3x10 3x12 3x15 3x15 3x15     TR   3x10 3x10 3x12 3x15 3x15 3x15     Step downs   4"  3x10 4"  3x10 L3  3x10 L3  3x12 L3  3x12 L3  3x12     SLS with alpha   x2  B x2  B x2  B x2  B x2  B x2  B     Wall slides   3x10 3x10 3x12 3x15 np 3x15     Gastroc str      3x30" 3x30" 3x30"     Soleus str      3x30" 3x30" 3x30"     BAPS all dir      L!1   L1  30x L1  30x     Balance Beam  Dips       x6 x6     LP HR's        50#  3x10                                                                          Modalities  10/24             None today

## 2023-12-29 NOTE — ED PROVIDER NOTE - CLINICAL SUMMARY MEDICAL DECISION MAKING FREE TEXT BOX
Agnes Gutierrez DO PGY-3  HPI:   73-year-old male with history of A fib on xarelto, CAD status post 4 stents, diabetes, hypertension, asthma, presents to the ED with acute onset chest pain shortness of breath and palpitations that occurred while washing dishes today just prior to arrival.  Called EMS and patient had increased work of breathing so he was started on CPAP, given 324 mg of aspirin and 3 times nitro sublingual with improvement of symptoms.  Initially was in SVT heart rate to the 180s per EMS and hypertensive.  Upon arrival to the ER patient's work of breathing significantly improved, symptoms resolved, heart rate in the 100s to 110s, .  Patient did not take any of his blood pressure medications today.    ROS:   Denies fever, chills, chest pain, shortness of breath, abdominal pain, nausea, vomiting, diarrhea, dysuria, hematuria    PHYSICAL EXAM:  CONSTITUTIONAL: Well appearing, awake, alert, oriented to person, place, time/situation and in no apparent distress.  HEAD: Atraumatic, no step offs.  NECK: Supple, no meningismus.   EYES: Clear bilaterally, pupils equal, round and reactive to light.  ENMT: Airway patent, Nasal mucosa clear. Mouth with normal mucosa. Uvula is midline.   CARDIAC: Normal rate, regular rhythm. +S1/S2. No murmurs, rubs or gallops.  RESPIRATORY: Breathing unlabored. Breath sounds clear and equal bilaterally.  ABDOMEN:  Soft, nontender, nondistended. No rebound tenderness or guarding.  NEUROLOGICAL: Alert and oriented, no focal deficits, no motor or sensory deficits.   MSK: No clubbing, cyanosis. B/L lower extremity edema consistent with chronic venous stasis.   SKIN: Skin warm and dry. No evidence of rashes or lesions.    MDM:   73-year-old male with history of diabetes, hypertension, CAD, A-fib on Xarelto, presents to the ED with acute onset of shortness of breath, chest pain, palpitations occurred while washing dishes.  Received aspirin, nitroglycerin and CPAP via EMS which improved symptoms.  Arrived in the ER tachycardic sinus to the 100s, BP 170s over 90s, satting 100% on 30% fio2 afebrile, nontoxic-appearing, no increased work of breathing with clear lungs on exam.  Differential includes but is not limited to A-fib with RVR, hematologic or electrolyte abnormalities, ACS, infection, pneumonia, pneumothorax.   Plan to obtain labs, chest x-ray, cardiac monitoring, give home BP meds, and reassess. Agnes Gutierrez DO PGY-3  HPI:   73-year-old male with history of A fib on xarelto, CAD status post 4 stents, diabetes, hypertension, asthma, presents to the ED with acute onset chest pain shortness of breath and palpitations that occurred while washing dishes today just prior to arrival.  Called EMS and patient had increased work of breathing so he was started on CPAP, given 324 mg of aspirin and 3 times nitro sublingual with improvement of symptoms.  Initially was in SVT heart rate to the 180s per EMS and hypertensive.  Upon arrival to the ER patient's work of breathing significantly improved, symptoms resolved, heart rate in the 100s to 110s, .  Patient did not take any of his blood pressure medications today.    ROS:   Denies fever, chills, chest pain, shortness of breath, abdominal pain, nausea, vomiting, diarrhea, dysuria, hematuria    PHYSICAL EXAM:  CONSTITUTIONAL: Well appearing, awake, alert, oriented to person, place, time/situation and in no apparent distress.  HEAD: Atraumatic, no step offs.  NECK: Supple, no meningismus.   EYES: Clear bilaterally, pupils equal, round and reactive to light.  ENMT: Airway patent, Nasal mucosa clear. Mouth with normal mucosa. Uvula is midline.   CARDIAC: Normal rate, regular rhythm. +S1/S2. No murmurs, rubs or gallops.  RESPIRATORY: Breathing unlabored. Breath sounds clear and equal bilaterally.  ABDOMEN:  Soft, nontender, nondistended. No rebound tenderness or guarding.  NEUROLOGICAL: Alert and oriented, no focal deficits, no motor or sensory deficits.   MSK: No clubbing, cyanosis. B/L lower extremity edema consistent with chronic venous stasis.   SKIN: Skin warm and dry. No evidence of rashes or lesions.    MDM:   73-year-old male with history of diabetes, hypertension, CAD, A-fib on Xarelto, presents to the ED with acute onset of shortness of breath, chest pain, palpitations occurred while washing dishes.  Received aspirin, nitroglycerin and CPAP via EMS which improved symptoms.  Arrived in the ER tachycardic sinus to the 100s, BP 170s over 90s, satting 100% on 30% fio2 afebrile, nontoxic-appearing, no increased work of breathing with clear lungs on exam.  Differential includes but is not limited to A-fib with RVR, hematologic or electrolyte abnormalities, ACS, infection, pneumonia, pneumothorax.   Plan to obtain labs, chest x-ray, cardiac monitoring, give home BP meds, and reassess.    Attending Ted: 72 y/o M w/ PMH of a fib on xarelto, HTN, DM, CAD presenting w/ SOB. Seen and examined together w/ resident, agree w/ above. Pt reports sudden onset SOB and chest pain while washing dishes. EMS reportedly found pt in respiratory distress and tachycardic to 180s. Pt was placed on CPAP w/ improvement of his symptoms. Pt reports felt well prior to symptoms starting and had felt well the past few days. Has not taken any of his morning medications yet. Pt overall no acute distress. Lungs grossly clear. Work of breathing improving while on BIPAP. HR tachycardic. Unclear cause of symptoms, possible a fib RVR vs. SVT vs. acute CHF. Will eval for ACS. Doubtful PNA or viral URI. plan for labs, imaging, ekg, meds. Pt will require admission. Will reassess the need for additional interventions as clinically warranted. Refer to any progress notes for updates on clinical course and as a continuation of this MDM. Agnes Gutierrez DO PGY-3  HPI:   73-year-old male with history of A fib on xarelto, CAD status post 4 stents, diabetes, hypertension, asthma, presents to the ED with acute onset chest pain shortness of breath and palpitations that occurred while washing dishes today just prior to arrival.  Called EMS and patient had increased work of breathing so he was started on CPAP, given 324 mg of aspirin and 3 times nitro sublingual with improvement of symptoms.  Initially was in SVT heart rate to the 180s per EMS and hypertensive.  Upon arrival to the ER patient's work of breathing significantly improved, symptoms resolved, heart rate in the 100s to 110s, .  Patient did not take any of his blood pressure medications today.    ROS:   Denies fever, chills, chest pain, shortness of breath, abdominal pain, nausea, vomiting, diarrhea, dysuria, hematuria    PHYSICAL EXAM:  CONSTITUTIONAL: Well appearing, awake, alert, oriented to person, place, time/situation and in no apparent distress.  HEAD: Atraumatic, no step offs.  NECK: Supple, no meningismus.   EYES: Clear bilaterally, pupils equal, round and reactive to light.  ENMT: Airway patent, Nasal mucosa clear. Mouth with normal mucosa. Uvula is midline.   CARDIAC: Normal rate, regular rhythm. +S1/S2. No murmurs, rubs or gallops.  RESPIRATORY: Breathing unlabored. Breath sounds clear and equal bilaterally.  ABDOMEN:  Soft, nontender, nondistended. No rebound tenderness or guarding.  NEUROLOGICAL: Alert and oriented, no focal deficits, no motor or sensory deficits.   MSK: No clubbing, cyanosis. B/L lower extremity edema consistent with chronic venous stasis.   SKIN: Skin warm and dry. No evidence of rashes or lesions.    MDM:   73-year-old male with history of diabetes, hypertension, CAD, A-fib on Xarelto, presents to the ED with acute onset of shortness of breath, chest pain, palpitations occurred while washing dishes.  Received aspirin, nitroglycerin and CPAP via EMS which improved symptoms.  Arrived in the ER tachycardic sinus to the 100s, BP 170s over 90s, satting 100% on 30% fio2 afebrile, nontoxic-appearing, no increased work of breathing with clear lungs on exam.  Differential includes but is not limited to A-fib with RVR, hematologic or electrolyte abnormalities, ACS, infection, pneumonia, pneumothorax.   Plan to obtain labs, chest x-ray, cardiac monitoring, give home BP meds, and reassess.    Attending Ted: 74 y/o M w/ PMH of a fib on xarelto, HTN, DM, CAD presenting w/ SOB. Seen and examined together w/ resident, agree w/ above. Pt reports sudden onset SOB and chest pain while washing dishes. EMS reportedly found pt in respiratory distress and tachycardic to 180s. Pt was placed on CPAP w/ improvement of his symptoms. Pt reports felt well prior to symptoms starting and had felt well the past few days. Has not taken any of his morning medications yet. Pt overall no acute distress. Lungs grossly clear. Work of breathing improving while on BIPAP. HR tachycardic. Unclear cause of symptoms, possible a fib RVR vs. SVT vs. acute CHF. Will eval for ACS. Doubtful PNA or viral URI. plan for labs, imaging, ekg, meds. Pt will require admission. Will reassess the need for additional interventions as clinically warranted. Refer to any progress notes for updates on clinical course and as a continuation of this MDM.

## 2023-12-29 NOTE — ED ADULT NURSE NOTE - NSFALLRISKINTERV_ED_ALL_ED
Communicate fall risk and risk factors to all staff, patient, and family/Provide visual cue: yellow wristband, yellow gown, etc/Reinforce activity limits and safety measures with patient and family/Call bell, personal items and telephone in reach/Instruct patient to call for assistance before getting out of bed/chair/stretcher/Non-slip footwear applied when patient is off stretcher/[place] to call system/Physically safe environment - no spills, clutter or unnecessary equipment/Purposeful Proactive Rounding/Room/bathroom lighting operational, light cord in reach Communicate fall risk and risk factors to all staff, patient, and family/Provide visual cue: yellow wristband, yellow gown, etc/Reinforce activity limits and safety measures with patient and family/Call bell, personal items and telephone in reach/Instruct patient to call for assistance before getting out of bed/chair/stretcher/Non-slip footwear applied when patient is off stretcher/[place] to call system/Physically safe environment - no spills, clutter or unnecessary equipment/Purposeful Proactive Rounding/Room/bathroom lighting operational, light cord in reach

## 2023-12-29 NOTE — ED PROVIDER NOTE - ATTENDING CONTRIBUTION TO CARE
Attending Ted: I performed a history and physical exam of the patient and discussed their management with the resident/fellow/student. I have reviewed the resident/fellow/student note and agree with the documented findings and plan of care, except as noted. I have personally performed a substantive portion of the visit including all aspects of the medical decision making. My medical decision making and observations are found above. Please refer to any progress notes for updates on clinical course. My notes supersedes the above resident/fellow/student note in case of discrepancy

## 2023-12-29 NOTE — H&P ADULT - ASSESSMENT
73 yrs old M, from home, ambulating with rollator, HHA twice weekly, pmhx of HTN, HLD, Asthma, DM, ?Afib, pshx bariatric sx, presented with chest pressure. Pt is admitted for ACS  73 yrs old M, from home, ambulating with rollator, HHA twice weekly, pmhx of CAD s/p 4 stent s about 3 yrs ago, ?Afib, HTN, DM, HLD, Asthma, pshx bariatric sx, presented with chest pressure. Pt is admitted for ACS

## 2023-12-29 NOTE — ED ADULT NURSE NOTE - PAIN: PRESENCE, MLM
denies pain/discomfort (Rating = 0) 61M w/ hx of Cerebellar ataxia recent prolonged admission including MICU stay/ intubation from 4/18-5/13 for AMS and airway protection p/w AMS and cough. There was component of rapid neurocognitive decline w/ cerebellar ataxia and possible meningeal findings. Family refused meningeal biopsy. Endocrine work up for hypothermia that admission also unremarkable. As per wife, pt seemed closer to baseline and was able to answer appropriately by time of discharge to rehab. Pt has since been at rehab, starting roughly 4 days ago pt's wife noticed pt becoming more altered and lethargic. Has had decreased PO on pureed diet from discharge. Wife has also noticed wet cough around some time period. When wife felt these symptoms continued to worsen and rehab continued to make no interventions she sent him to hospital for further evaluation.    In ER: Given 1L LR

## 2023-12-29 NOTE — H&P ADULT - PROBLEM SELECTOR PLAN 4
hx of HLD on Atorvastatin 40 mg   c/w home meds  f/u lipid panel hx of HTN on Losartan 100 mg, Metoprolol succ 100 mg, Nifedipine XL 60, HCTZ 25 mg   c/w home meds with holding parameters

## 2023-12-29 NOTE — ED PROVIDER NOTE - NSICDXPASTMEDICALHX_GEN_ALL_CORE_FT
PAST MEDICAL HISTORY:  Atrial fibrillation     CAD (coronary artery disease)     Diabetes     HTN (hypertension)

## 2023-12-29 NOTE — H&P ADULT - PROBLEM SELECTOR PLAN 2
p/w chest pain and palpitation   EKG: sinus tachycardia with nonspecific T wave changes  Trop x1 ng  c/w tele and vitals q4  c/w ASA and Atorvastatin   f/u cardiac enzyme   will defer TTE for now unless recommended by Cardio as pt had recent normal Echo on Oct [as per pt]  Cardiology Dr. Nunez consulted

## 2023-12-29 NOTE — H&P ADULT - PROBLEM SELECTOR PLAN 5
hx of BPH on Tamsulosin and finasteride and DDAVP for nocturia   c/w home meds hx of HLD on Atorvastatin 40 mg   c/w home meds  f/u lipid panel

## 2023-12-29 NOTE — H&P ADULT - NSHPPHYSICALEXAM_GEN_ALL_CORE
GENERAL: NAD, lying in bed comfortably, morbidly obese   HEAD:  Atraumatic, Normocephalic  EYES: EOMI, PERRLA, conjunctiva and sclera clear  ENT: Moist mucous membranes  NECK: Supple, No JVD  CHEST/LUNG: Clear to auscultation bilaterally; No rales, rhonchi, wheezing, or rubs. Unlabored respirations  HEART: increased rate and normal rhythm; + loud S1 and S2 on the apex and Lt lower sternal border, ?S4 vs prominent split of S2 on lt lower sternal border, No murmurs, rubs, or gallops  ABDOMEN: Bowel sounds present; Soft, Nontender, Nondistended.   EXTREMITIES:  2+ Peripheral Pulses, brisk capillary refill. No clubbing, cyanosis, or edema  NERVOUS SYSTEM:  Alert & Oriented X3, speech clear. No deficits   MSK: FROM all 4 extremities, full and equal strength  SKIN: No rashes or lesions

## 2023-12-29 NOTE — H&P ADULT - HISTORY OF PRESENT ILLNESS
73 yrs old M, from home, ambulating with rollator, HHA twice weekly, pmhx of HTN, HLD, Asthma, DM, ?Afib, pshx bariatric sx, presented with chest pressure. Pt stated that chest pressure was central without any radiation, with palpitation that lasted about 30 minutes and was resolved by the time pt was at the hospital. Denied sweating, dizziness, blurring of vision, dyspnea, orthopnea, abdominal pain, N/V/D, dysuria, weakness or decreased sensation in the extremities. He has been having chronic LE swelling which he attributes to chronic venous insufficiency, which he usually either wraps or uses stockings. He stated that he had similar chest pain in the past on and off, endorsed having a stress test and Echo done which were normal. He reported of snoring at nights, however was never dx or assessed for TREVON.   Pt was provided with 324 mg of aspirin, sublingual nitro, placed on CPAP for increased work of breathing and was noted to be in SVT with HR 180s in ED.   Pt consumes alcohol occasionally and smokes marijuana about 5-6 joints daily, denied tobacco smoking, or use of illicit drugs.   73 yrs old M, from home, ambulating with rollator, HHA twice weekly, pmhx of  CAD s/p 4 stent s about 3 yrs ago, ?Afib, HTN, DM, HLD, Asthma, pshx bariatric sx, presented with chest pressure. Pt stated that chest pressure was central without any radiation, with palpitation that lasted about 30 minutes and was resolved by the time pt was at the hospital. Denied sweating, dizziness, blurring of vision, dyspnea, orthopnea, abdominal pain, N/V/D, dysuria, weakness or decreased sensation in the extremities. He has been having chronic LE swelling which he attributes to chronic venous insufficiency, which he usually either wraps or uses stockings. He stated that he had similar chest pain in the past on and off, endorsed having a stress test and Echo done which were normal. He reported of snoring at nights, however was never dx or assessed for TREVON.   Pt was provided with 324 mg of aspirin, sublingual nitro, placed on CPAP for increased work of breathing and was noted to be in SVT with HR 180s in ED.   Pt consumes alcohol occasionally and smokes marijuana about 5-6 joints daily, denied tobacco smoking, or use of illicit drugs.

## 2023-12-30 ENCOUNTER — TRANSCRIPTION ENCOUNTER (OUTPATIENT)
Age: 73
End: 2023-12-30

## 2023-12-30 VITALS
DIASTOLIC BLOOD PRESSURE: 95 MMHG | RESPIRATION RATE: 18 BRPM | SYSTOLIC BLOOD PRESSURE: 157 MMHG | HEART RATE: 84 BPM | TEMPERATURE: 98 F | OXYGEN SATURATION: 97 %

## 2023-12-30 LAB
A1C WITH ESTIMATED AVERAGE GLUCOSE RESULT: 6.5 % — HIGH (ref 4–5.6)
A1C WITH ESTIMATED AVERAGE GLUCOSE RESULT: 6.5 % — HIGH (ref 4–5.6)
ALBUMIN SERPL ELPH-MCNC: 3.4 G/DL — LOW (ref 3.5–5)
ALBUMIN SERPL ELPH-MCNC: 3.4 G/DL — LOW (ref 3.5–5)
ALP SERPL-CCNC: 83 U/L — SIGNIFICANT CHANGE UP (ref 40–120)
ALP SERPL-CCNC: 83 U/L — SIGNIFICANT CHANGE UP (ref 40–120)
ALT FLD-CCNC: 17 U/L DA — SIGNIFICANT CHANGE UP (ref 10–60)
ALT FLD-CCNC: 17 U/L DA — SIGNIFICANT CHANGE UP (ref 10–60)
ANION GAP SERPL CALC-SCNC: 6 MMOL/L — SIGNIFICANT CHANGE UP (ref 5–17)
ANION GAP SERPL CALC-SCNC: 6 MMOL/L — SIGNIFICANT CHANGE UP (ref 5–17)
AST SERPL-CCNC: 26 U/L — SIGNIFICANT CHANGE UP (ref 10–40)
AST SERPL-CCNC: 26 U/L — SIGNIFICANT CHANGE UP (ref 10–40)
BILIRUB SERPL-MCNC: 0.4 MG/DL — SIGNIFICANT CHANGE UP (ref 0.2–1.2)
BILIRUB SERPL-MCNC: 0.4 MG/DL — SIGNIFICANT CHANGE UP (ref 0.2–1.2)
BUN SERPL-MCNC: 16 MG/DL — SIGNIFICANT CHANGE UP (ref 7–18)
BUN SERPL-MCNC: 16 MG/DL — SIGNIFICANT CHANGE UP (ref 7–18)
CALCIUM SERPL-MCNC: 8.6 MG/DL — SIGNIFICANT CHANGE UP (ref 8.4–10.5)
CALCIUM SERPL-MCNC: 8.6 MG/DL — SIGNIFICANT CHANGE UP (ref 8.4–10.5)
CHLORIDE SERPL-SCNC: 106 MMOL/L — SIGNIFICANT CHANGE UP (ref 96–108)
CHLORIDE SERPL-SCNC: 106 MMOL/L — SIGNIFICANT CHANGE UP (ref 96–108)
CHOLEST SERPL-MCNC: 147 MG/DL — SIGNIFICANT CHANGE UP
CHOLEST SERPL-MCNC: 147 MG/DL — SIGNIFICANT CHANGE UP
CO2 SERPL-SCNC: 29 MMOL/L — SIGNIFICANT CHANGE UP (ref 22–31)
CO2 SERPL-SCNC: 29 MMOL/L — SIGNIFICANT CHANGE UP (ref 22–31)
CREAT SERPL-MCNC: 1 MG/DL — SIGNIFICANT CHANGE UP (ref 0.5–1.3)
CREAT SERPL-MCNC: 1 MG/DL — SIGNIFICANT CHANGE UP (ref 0.5–1.3)
EGFR: 80 ML/MIN/1.73M2 — SIGNIFICANT CHANGE UP
EGFR: 80 ML/MIN/1.73M2 — SIGNIFICANT CHANGE UP
ESTIMATED AVERAGE GLUCOSE: 140 MG/DL — HIGH (ref 68–114)
ESTIMATED AVERAGE GLUCOSE: 140 MG/DL — HIGH (ref 68–114)
GLUCOSE BLDC GLUCOMTR-MCNC: 62 MG/DL — LOW (ref 70–99)
GLUCOSE BLDC GLUCOMTR-MCNC: 62 MG/DL — LOW (ref 70–99)
GLUCOSE BLDC GLUCOMTR-MCNC: 69 MG/DL — LOW (ref 70–99)
GLUCOSE BLDC GLUCOMTR-MCNC: 69 MG/DL — LOW (ref 70–99)
GLUCOSE BLDC GLUCOMTR-MCNC: 82 MG/DL — SIGNIFICANT CHANGE UP (ref 70–99)
GLUCOSE BLDC GLUCOMTR-MCNC: 82 MG/DL — SIGNIFICANT CHANGE UP (ref 70–99)
GLUCOSE BLDC GLUCOMTR-MCNC: 87 MG/DL — SIGNIFICANT CHANGE UP (ref 70–99)
GLUCOSE BLDC GLUCOMTR-MCNC: 87 MG/DL — SIGNIFICANT CHANGE UP (ref 70–99)
GLUCOSE SERPL-MCNC: 71 MG/DL — SIGNIFICANT CHANGE UP (ref 70–99)
GLUCOSE SERPL-MCNC: 71 MG/DL — SIGNIFICANT CHANGE UP (ref 70–99)
HCT VFR BLD CALC: 38.2 % — LOW (ref 39–50)
HCT VFR BLD CALC: 38.2 % — LOW (ref 39–50)
HCV AB S/CO SERPL IA: 13.42 S/CO — HIGH (ref 0–0.99)
HCV AB S/CO SERPL IA: 13.42 S/CO — HIGH (ref 0–0.99)
HCV AB SERPL-IMP: REACTIVE
HCV AB SERPL-IMP: REACTIVE
HDLC SERPL-MCNC: 63 MG/DL — SIGNIFICANT CHANGE UP
HDLC SERPL-MCNC: 63 MG/DL — SIGNIFICANT CHANGE UP
HGB BLD-MCNC: 12.1 G/DL — LOW (ref 13–17)
HGB BLD-MCNC: 12.1 G/DL — LOW (ref 13–17)
LIPID PNL WITH DIRECT LDL SERPL: 72 MG/DL — SIGNIFICANT CHANGE UP
LIPID PNL WITH DIRECT LDL SERPL: 72 MG/DL — SIGNIFICANT CHANGE UP
MAGNESIUM SERPL-MCNC: 1.7 MG/DL — SIGNIFICANT CHANGE UP (ref 1.6–2.6)
MAGNESIUM SERPL-MCNC: 1.7 MG/DL — SIGNIFICANT CHANGE UP (ref 1.6–2.6)
MCHC RBC-ENTMCNC: 31.7 GM/DL — LOW (ref 32–36)
MCHC RBC-ENTMCNC: 31.7 GM/DL — LOW (ref 32–36)
MCHC RBC-ENTMCNC: 31.7 PG — SIGNIFICANT CHANGE UP (ref 27–34)
MCHC RBC-ENTMCNC: 31.7 PG — SIGNIFICANT CHANGE UP (ref 27–34)
MCV RBC AUTO: 100 FL — SIGNIFICANT CHANGE UP (ref 80–100)
MCV RBC AUTO: 100 FL — SIGNIFICANT CHANGE UP (ref 80–100)
NON HDL CHOLESTEROL: 84 MG/DL — SIGNIFICANT CHANGE UP
NON HDL CHOLESTEROL: 84 MG/DL — SIGNIFICANT CHANGE UP
NRBC # BLD: 0 /100 WBCS — SIGNIFICANT CHANGE UP (ref 0–0)
NRBC # BLD: 0 /100 WBCS — SIGNIFICANT CHANGE UP (ref 0–0)
PHOSPHATE SERPL-MCNC: 3.4 MG/DL — SIGNIFICANT CHANGE UP (ref 2.5–4.5)
PHOSPHATE SERPL-MCNC: 3.4 MG/DL — SIGNIFICANT CHANGE UP (ref 2.5–4.5)
PLATELET # BLD AUTO: SIGNIFICANT CHANGE UP K/UL (ref 150–400)
PLATELET # BLD AUTO: SIGNIFICANT CHANGE UP K/UL (ref 150–400)
POTASSIUM SERPL-MCNC: 3.5 MMOL/L — SIGNIFICANT CHANGE UP (ref 3.5–5.3)
POTASSIUM SERPL-MCNC: 3.5 MMOL/L — SIGNIFICANT CHANGE UP (ref 3.5–5.3)
POTASSIUM SERPL-SCNC: 3.5 MMOL/L — SIGNIFICANT CHANGE UP (ref 3.5–5.3)
POTASSIUM SERPL-SCNC: 3.5 MMOL/L — SIGNIFICANT CHANGE UP (ref 3.5–5.3)
PROT SERPL-MCNC: 7.8 G/DL — SIGNIFICANT CHANGE UP (ref 6–8.3)
PROT SERPL-MCNC: 7.8 G/DL — SIGNIFICANT CHANGE UP (ref 6–8.3)
RBC # BLD: 3.82 M/UL — LOW (ref 4.2–5.8)
RBC # BLD: 3.82 M/UL — LOW (ref 4.2–5.8)
RBC # FLD: 13 % — SIGNIFICANT CHANGE UP (ref 10.3–14.5)
RBC # FLD: 13 % — SIGNIFICANT CHANGE UP (ref 10.3–14.5)
SODIUM SERPL-SCNC: 141 MMOL/L — SIGNIFICANT CHANGE UP (ref 135–145)
SODIUM SERPL-SCNC: 141 MMOL/L — SIGNIFICANT CHANGE UP (ref 135–145)
TRIGL SERPL-MCNC: 60 MG/DL — SIGNIFICANT CHANGE UP
TRIGL SERPL-MCNC: 60 MG/DL — SIGNIFICANT CHANGE UP
TROPONIN I, HIGH SENSITIVITY RESULT: 1212.5 NG/L — HIGH
TROPONIN I, HIGH SENSITIVITY RESULT: 1212.5 NG/L — HIGH
WBC # BLD: 5.66 K/UL — SIGNIFICANT CHANGE UP (ref 3.8–10.5)
WBC # BLD: 5.66 K/UL — SIGNIFICANT CHANGE UP (ref 3.8–10.5)
WBC # FLD AUTO: 5.66 K/UL — SIGNIFICANT CHANGE UP (ref 3.8–10.5)
WBC # FLD AUTO: 5.66 K/UL — SIGNIFICANT CHANGE UP (ref 3.8–10.5)

## 2023-12-30 PROCEDURE — 82330 ASSAY OF CALCIUM: CPT

## 2023-12-30 PROCEDURE — 83036 HEMOGLOBIN GLYCOSYLATED A1C: CPT

## 2023-12-30 PROCEDURE — 94660 CPAP INITIATION&MGMT: CPT

## 2023-12-30 PROCEDURE — 99285 EMERGENCY DEPT VISIT HI MDM: CPT

## 2023-12-30 PROCEDURE — 83880 ASSAY OF NATRIURETIC PEPTIDE: CPT

## 2023-12-30 PROCEDURE — 85025 COMPLETE CBC W/AUTO DIFF WBC: CPT

## 2023-12-30 PROCEDURE — 80053 COMPREHEN METABOLIC PANEL: CPT

## 2023-12-30 PROCEDURE — 99239 HOSP IP/OBS DSCHRG MGMT >30: CPT

## 2023-12-30 PROCEDURE — 84132 ASSAY OF SERUM POTASSIUM: CPT

## 2023-12-30 PROCEDURE — 82962 GLUCOSE BLOOD TEST: CPT

## 2023-12-30 PROCEDURE — 84100 ASSAY OF PHOSPHORUS: CPT

## 2023-12-30 PROCEDURE — 87521 HEPATITIS C PROBE&RVRS TRNSC: CPT

## 2023-12-30 PROCEDURE — 84443 ASSAY THYROID STIM HORMONE: CPT

## 2023-12-30 PROCEDURE — 84484 ASSAY OF TROPONIN QUANT: CPT

## 2023-12-30 PROCEDURE — 83735 ASSAY OF MAGNESIUM: CPT

## 2023-12-30 PROCEDURE — 83605 ASSAY OF LACTIC ACID: CPT

## 2023-12-30 PROCEDURE — 36415 COLL VENOUS BLD VENIPUNCTURE: CPT

## 2023-12-30 PROCEDURE — 84295 ASSAY OF SERUM SODIUM: CPT

## 2023-12-30 PROCEDURE — 82550 ASSAY OF CK (CPK): CPT

## 2023-12-30 PROCEDURE — 93005 ELECTROCARDIOGRAM TRACING: CPT

## 2023-12-30 PROCEDURE — 82553 CREATINE MB FRACTION: CPT

## 2023-12-30 PROCEDURE — 82803 BLOOD GASES ANY COMBINATION: CPT

## 2023-12-30 PROCEDURE — 86803 HEPATITIS C AB TEST: CPT

## 2023-12-30 PROCEDURE — 85027 COMPLETE CBC AUTOMATED: CPT

## 2023-12-30 PROCEDURE — 71045 X-RAY EXAM CHEST 1 VIEW: CPT

## 2023-12-30 PROCEDURE — 80061 LIPID PANEL: CPT

## 2023-12-30 RX ORDER — INSULIN GLARGINE 100 [IU]/ML
20 INJECTION, SOLUTION SUBCUTANEOUS AT BEDTIME
Refills: 0 | Status: DISCONTINUED | OUTPATIENT
Start: 2023-12-30 | End: 2023-12-30

## 2023-12-30 RX ORDER — METOPROLOL TARTRATE 50 MG
1 TABLET ORAL
Qty: 60 | Refills: 0
Start: 2023-12-30 | End: 2024-01-28

## 2023-12-30 RX ADMIN — FINASTERIDE 5 MILLIGRAM(S): 5 TABLET, FILM COATED ORAL at 12:25

## 2023-12-30 RX ADMIN — DESMOPRESSIN ACETATE 0.2 MILLIGRAM(S): 0.1 TABLET ORAL at 12:26

## 2023-12-30 RX ADMIN — Medication 50 MILLIGRAM(S): at 05:07

## 2023-12-30 RX ADMIN — Medication 60 MILLIGRAM(S): at 05:07

## 2023-12-30 RX ADMIN — LOSARTAN POTASSIUM 100 MILLIGRAM(S): 100 TABLET, FILM COATED ORAL at 05:07

## 2023-12-30 RX ADMIN — ISOSORBIDE MONONITRATE 30 MILLIGRAM(S): 60 TABLET, EXTENDED RELEASE ORAL at 12:26

## 2023-12-30 RX ADMIN — Medication 81 MILLIGRAM(S): at 12:26

## 2023-12-30 RX ADMIN — PANTOPRAZOLE SODIUM 40 MILLIGRAM(S): 20 TABLET, DELAYED RELEASE ORAL at 06:27

## 2023-12-30 NOTE — DISCHARGE NOTE PROVIDER - NSDCMRMEDTOKEN_GEN_ALL_CORE_FT
albuterol 90 mcg/inh inhalation aerosol: 2 puff(s) inhaled every 4 hours as needed for  shortness of breath and/or wheezing  aspirin 81 mg oral delayed release tablet: 1 tab(s) orally once a day  atorvastatin 80 mg oral tablet: 1 tab(s) orally once a day  celecoxib 50 mg oral capsule: 1 cap(s) orally once a day  cyclobenzaprine 5 mg oral tablet: 2 tab(s) orally 3 times a day as needed for  muscle spasm  DDAVP 0.2 mg oral tablet: 1 tab(s) orally once a day  dexlansoprazole 60 mg oral delayed release capsule: 1 cap(s) orally once a day before breakfast  ergocalciferol 1.25 mg (50,000 intl units) oral capsule: 1 cap(s) orally every 7 days on Sunday  finasteride 5 mg oral tablet: 1 tab(s) orally once a day  hydroCHLOROthiazide 25 mg oral tablet: 1 tab(s) orally once a day  isosorbide mononitrate 30 mg oral tablet, extended release: 1 tab(s) orally once a day  Lantus Solostar Pen 100 units/mL subcutaneous solution: 40 unit(s) subcutaneous once a day (at bedtime)  losartan 100 mg oral tablet: 1 tab(s) orally once a day  metFORMIN 1000 mg oral tablet: 1 tab(s) orally 2 times a day (with meals)  metoprolol succinate 100 mg oral tablet, extended release: 1 tab(s) orally once a day  NIFEdipine 60 mg oral tablet, extended release: 1 tab(s) orally once a day  Ozempic 8 mg/3 mL (2 mg dose) subcutaneous solution: 2 milligram(s) subcutaneously every 7 days on Thursday  tamsulosin 0.4 mg oral capsule: 1 cap(s) orally once a day  Xarelto 20 mg oral tablet: 1 tab(s) orally once a day (takes daytime, not at night)   albuterol 90 mcg/inh inhalation aerosol: 2 puff(s) inhaled every 4 hours as needed for  shortness of breath and/or wheezing  aspirin 81 mg oral delayed release tablet: 1 tab(s) orally once a day  atorvastatin 80 mg oral tablet: 1 tab(s) orally once a day  celecoxib 50 mg oral capsule: 1 cap(s) orally once a day  cyclobenzaprine 5 mg oral tablet: 2 tab(s) orally 3 times a day as needed for  muscle spasm  DDAVP 0.2 mg oral tablet: 1 tab(s) orally once a day  dexlansoprazole 60 mg oral delayed release capsule: 1 cap(s) orally once a day before breakfast  ergocalciferol 1.25 mg (50,000 intl units) oral capsule: 1 cap(s) orally every 7 days on Sunday  finasteride 5 mg oral tablet: 1 tab(s) orally once a day  hydroCHLOROthiazide 25 mg oral tablet: 1 tab(s) orally once a day  isosorbide mononitrate 30 mg oral tablet, extended release: 1 tab(s) orally once a day  Lantus Solostar Pen 100 units/mL subcutaneous solution: 40 unit(s) subcutaneous once a day (at bedtime)  losartan 100 mg oral tablet: 1 tab(s) orally once a day  metFORMIN 1000 mg oral tablet: 1 tab(s) orally 2 times a day (with meals)  metoprolol succinate 100 mg oral tablet, extended release: 1 tab(s) orally every 12 hours  NIFEdipine 60 mg oral tablet, extended release: 1 tab(s) orally once a day  Ozempic 8 mg/3 mL (2 mg dose) subcutaneous solution: 2 milligram(s) subcutaneously every 7 days on Thursday  tamsulosin 0.4 mg oral capsule: 1 cap(s) orally once a day  Xarelto 20 mg oral tablet: 1 tab(s) orally once a day (takes daytime, not at night)

## 2023-12-30 NOTE — DISCHARGE NOTE PROVIDER - HOSPITAL COURSE
Post-Care Instructions: I reviewed with the patient in detail post-care instructions. Patient is to wear sunprotection, and avoid picking at any of the treated lesions. Pt may apply Vaseline to crusted or scabbing areas. Bill Insurance (You Assume Risk Of Denial Or Audit By Selecting Yes): Yes Include Z78.9 (Other Specified Conditions Influencing Health Status) As An Associated Diagnosis?: No Medical Necessity Clause: This procedure was medically necessary because the lesions that were treated were: Consent: The patient's consent was obtained including but not limited to risks of crusting, scabbing, blistering, scarring, darker or lighter pigmentary change, recurrence, incomplete removal and infection. Medical Necessity Information: It is in your best interest to select a reason for this procedure from the list below. All of these items fulfill various CMS LCD requirements except the new and changing color options. Detail Level: Detailed Duration Of Freeze Thaw-Cycle (Seconds): 0 73 yrs old M, from home, ambulating with rollator, HHA twice weekly, pmhx of  CAD s/p 4 stent s about 3 yrs ago, ?Afib, HTN, DM, HLD, Asthma, pshx bariatric sx, presented with chest pressure. Pt stated that chest pressure was central without any radiation, with palpitation that lasted about 30 minutes and was resolved by the time pt was at the hospital. Denied sweating, dizziness, blurring of vision, dyspnea, orthopnea, abdominal pain, N/V/D, dysuria, weakness or decreased sensation in the extremities. He has been having chronic LE swelling which he attributes to chronic venous insufficiency, which he usually either wraps or uses stockings. He stated that he had similar chest pain in the past on and off, endorsed having a stress test and Echo done which were normal. He reported of snoring at nights, however was never dx or assessed for TREVON.   Pt was provided with 324 mg of aspirin, sublingual nitro, placed on CPAP for increased work of breathing and was noted to be in SVT with HR 180s in ED.   Pt consumes alcohol occasionally and smokes marijuana about 5-6 joints daily, denied tobacco smoking, or use of illicit drugs.      In the ED: Temp 98.2, , /95, RR 18, Pulseox 100% on Bipap 15L. SVT broke spontaneously to sinus tach. Admitted for ACS rule-out.    #Troponemia/SVT  Troponin 29 -> 1422 -> 1212  EKG sinus tachycardia without acute changes  Cardiology was consulted suspect troponin rise was due to demand.  CK was negative  Had recent stress test without ischemia  Continued on aspirin, statin, imdur, nifedipine  Recommended to increase metoprolol from 50mg BID to 100mg BID  Has outpatient Cards follow-up on 1/2/24.    #AF  Continued on xarelto    Please note this is only a summary, refer to the full chart for further details. 73 yrs old M, from home, ambulating with rollator, HHA twice weekly, pmhx of  CAD s/p 4 stent s about 3 yrs ago, ?Afib, HTN, DM, HLD, Asthma, pshx bariatric sx, presented with chest pressure. Pt stated that chest pressure was central without any radiation, with palpitation that lasted about 30 minutes and was resolved by the time pt was at the hospital. Denied sweating, dizziness, blurring of vision, dyspnea, orthopnea, abdominal pain, N/V/D, dysuria, weakness or decreased sensation in the extremities. He has been having chronic LE swelling which he attributes to chronic venous insufficiency, which he usually either wraps or uses stockings. He stated that he had similar chest pain in the past on and off, endorsed having a stress test and Echo done which were normal. He reported of snoring at nights, however was never dx or assessed for TREVON.   Pt was provided with 324 mg of aspirin, sublingual nitro, placed on CPAP for increased work of breathing and was noted to be in SVT with HR 180s in ED.   Pt consumes alcohol occasionally and smokes marijuana about 5-6 joints daily, denied tobacco smoking, or use of illicit drugs.      In the ED: Temp 98.2, , /95, RR 18, Pulseox 100% on Bipap 15L. SVT broke spontaneously to sinus tach. Admitted for ACS rule-out.    #Troponemia/SVT  Troponin 29 -> 1422 -> 1212  EKG sinus tachycardia without acute changes  Cardiology was consulted suspect troponin rise was due to demand.  CK was negative  Had recent stress test without ischemia  Continued on aspirin, statin, imdur, nifedipine  Recommended to increase metoprolol from 100mg QD to 100mg BID  Has outpatient Cards follow-up on 1/2/24.    #AF  Continued on xarelto    Please note this is only a summary, refer to the full chart for further details. 73 yrs old M, from home, ambulating with rollator, HHA twice weekly, pmhx of  CAD s/p 4 stent s about 3 yrs ago, ?Afib, HTN, DM, HLD, Asthma, pshx bariatric sx, presented with chest pressure. Pt stated that chest pressure was central without any radiation, with palpitation that lasted about 30 minutes and was resolved by the time pt was at the hospital. Denied sweating, dizziness, blurring of vision, dyspnea, orthopnea, abdominal pain, N/V/D, dysuria, weakness or decreased sensation in the extremities. He has been having chronic LE swelling which he attributes to chronic venous insufficiency, which he usually either wraps or uses stockings. He stated that he had similar chest pain in the past on and off, endorsed having a stress test and Echo done which were normal. He reported of snoring at nights, however was never dx or assessed for TREVON.   Pt was provided with 324 mg of aspirin, sublingual nitro, placed on CPAP for increased work of breathing and was noted to be in SVT with HR 180s in ED.   Pt consumes alcohol occasionally and smokes marijuana about 5-6 joints daily, denied tobacco smoking, or use of illicit drugs.      In the ED: Temp 98.2, , /95, RR 18, Pulseox 100% on Bipap 15L. SVT broke spontaneously to sinus tach. Admitted for ACS rule-out.    #Troponemia/SVT  Troponin 29 -> 1422 -> 1212  EKG sinus tachycardia without acute changes  Cardiology was consulted suspect troponin rise was due to demand.  CK was negative  Had recent stress test without ischemia  Continued on aspirin, statin, imdur, nifedipine  Recommended to increase metoprolol from 100mg QD to 100mg BID  Has outpatient Cards follow-up on 1/2/24.    #AF  Continued on xarelto    #HHA  Case management was consulted and cleared for discharge.  Since patient was admitted <24 hours and does not have HHA for 30 hrs/week it does not need to be reinstated and he can follow-up with his agency at home.    Please note this is only a summary, refer to the full chart for further details.

## 2023-12-30 NOTE — DISCHARGE NOTE NURSING/CASE MANAGEMENT/SOCIAL WORK - NSDCPEFALRISK_GEN_ALL_CORE
For information on Fall & Injury Prevention, visit: [email or web address] OR  [email or web address] OR  https://www.cdc.gov/steadi/patient.html For information on Fall & Injury Prevention, visit: [email or web address] OR  [email or web address] OR  https://www.cdc.gov/steadi/patient.html

## 2023-12-30 NOTE — DISCHARGE NOTE NURSING/CASE MANAGEMENT/SOCIAL WORK - PATIENT PORTAL LINK FT
You can access the FollowMyHealth Patient Portal offered by [facility] by registering at the following website: [email or web address]. By joining [facility]’s FollowMyHealth portal, you will also be able to view your health information using other applications (apps) compatible with our system. You can access the FollowMyHealth Patient Portal offered by Health system by registering at the following website: [email or web address]. By joining [employer]’s FollowMyHealth portal, you will also be able to view your health information using other applications (apps) compatible with our system.

## 2023-12-30 NOTE — DISCHARGE NOTE PROVIDER - ATTENDING DISCHARGE PHYSICAL EXAMINATION:
Vital Signs Last 24 Hrs  T(C): 36.4 (30 Dec 2023 11:25), Max: 37.1 (29 Dec 2023 20:30)  T(F): 97.6 (30 Dec 2023 11:25), Max: 98.8 (29 Dec 2023 20:30)  HR: 84 (30 Dec 2023 11:25) (81 - 109)  BP: 157/95 (30 Dec 2023 11:25) (151/82 - 166/76)  BP(mean): 115 (30 Dec 2023 11:25) (115 - 121)  RR: 18 (30 Dec 2023 11:25) (18 - 18)  SpO2: 97% (30 Dec 2023 11:25) (97% - 100%)    Parameters below as of 30 Dec 2023 11:25  Patient On (Oxygen Delivery Method): room air    Physical Exam: GENERAL: NAD, lying in bed comfortably, morbidly obese   HEAD:  Atraumatic, Normocephalic  EYES: EOMI, PERRLA, conjunctiva and sclera clear  ENT: Moist mucous membranes  NECK: Supple, No JVD  CHEST/LUNG: Clear to auscultation bilaterally; No rales, rhonchi, wheezing, or rubs. Unlabored respirations  HEART: increased rate and normal rhythm; + loud S1 and S2 on the apex and Lt lower sternal border, ?S4 vs prominent split of S2 on lt lower sternal border, No murmurs, rubs, or gallops  ABDOMEN: Bowel sounds present; Soft, Nontender, Nondistended.   EXTREMITIES:  2+ Peripheral Pulses, brisk capillary refill. No clubbing, cyanosis, or edema  NERVOUS SYSTEM:  Alert & Oriented X3, speech clear. No deficits   MSK: FROM all 4 extremities, full and equal strength  SKIN: No rashes or lesions

## 2023-12-30 NOTE — PROVIDER CONTACT NOTE (CRITICAL VALUE NOTIFICATION) - ACTION/TREATMENT ORDERED:
Addended by: PHIL WILLIAM on: 1/8/2020 05:30 PM     Modules accepted: Orders     Carbs + meal given. Blood glucose recheck. Glucose 87.

## 2023-12-30 NOTE — DISCHARGE NOTE PROVIDER - NSDCCPCAREPLAN_GEN_ALL_CORE_FT
PRINCIPAL DISCHARGE DIAGNOSIS  Diagnosis: Chest pain  Assessment and Plan of Treatment: You came in with chest pressure and were found to be have a fast heart rate called supra-ventricular tachycardia.  Your heart rate slowed down by itself without treatment.  Blood tests showed an elevated troponin level.  You were seen by a Cardiologist and this was likely due to the elevated heart rate and not an indication of heart damage.  You were recommended to increase your metoprolol to 100mg twice daily.  Continue to follow-up with your Cardiologist on 1/2/24.      SECONDARY DISCHARGE DIAGNOSES  Diagnosis: SVT (supraventricular tachycardia)  Assessment and Plan of Treatment: See above.  You were recommended to increase your metoprolol to 100mg twice daily.

## 2023-12-30 NOTE — DISCHARGE NOTE PROVIDER - PROVIDER TOKENS
FREE:[LAST:[Your Cardiologist],PHONE:[(   )    -],FAX:[(   )    -],SCHEDULEDAPPT:[01/02/2024],ESTABLISHEDPATIENT:[T]]

## 2023-12-30 NOTE — DISCHARGE NOTE PROVIDER - CARE PROVIDER_API CALL
Your Cardiologist,   Phone: (   )    -  Fax: (   )    -  Established Patient  Scheduled Appointment: 01/02/2024

## 2023-12-30 NOTE — CONSULT NOTE ADULT - SUBJECTIVE AND OBJECTIVE BOX
C A R D I O L O G Y  *********************    DATE OF SERVICE: 12-30-23    HISTORY OF PRESENT ILLNESS: HPI:  Pt is a 73 yrs old M, pmhx of  CAD s/p PCI x3  in 04/2018 at [facility] but on Xarelto, HTN, DM, HLD, Asthma, CVA in 2006 with residual rleft sided weaknesspresented with chest pressure.     Pt stated that chest pressure was central without any radiation, with palpitation that lasted about 30 minutes and was resolved by the time pt was at the hospital. Denied sweating, dizziness, blurring of vision, dyspnea, orthopnea, abdominal pain, N/V/D, dysuria, weakness or decreased sensation in the extremities. He has been having chronic LE swelling which he attributes to chronic venous insufficiency, which he usually either wraps or uses stockings. He stated that he had similar chest pain in the past on and off/ Had recent TTE in October 26 for similar symptoms, stress showed no evidence of ischemia, and  fixed small to medium sized apical to basal inferior perfusion defect consistent with scar. EF on stress was 51% with inferior wall infarct without lester-infarct ishemia and inferior wall hypokinesis also had a TTE which showed EF of 55-60% with apical cap akinetic. As a result he was started on Imdur. Previosuly had an allergic reaction to contrast during PCI which was hives. Current MEds include ASA 81, Lipitor 80, Xarelto 20 mg, Metoprolol 100 mg Losartan 100 mg, Nifidipine 60 mg, HCTZ 25 mg      PAST MEDICAL & SURGICAL HISTORY:  CAD (coronary artery disease)  Atrial fibrillation  HTN (hypertension)  Diabetes  No significant past surgical history      MEDICATIONS:  MEDICATIONS  (STANDING):  aspirin enteric coated 81 milliGRAM(s) Oral daily  atorvastatin 80 milliGRAM(s) Oral at bedtime  desmopressin 0.2 milliGRAM(s) Oral daily  finasteride 5 milliGRAM(s) Oral daily  hydrochlorothiazide 25 milliGRAM(s) Oral daily  insulin glargine Injectable (LANTUS) 20 Unit(s) SubCutaneous at bedtime  insulin lispro (ADMELOG) corrective regimen sliding scale   SubCutaneous three times a day before meals  insulin lispro (ADMELOG) corrective regimen sliding scale   SubCutaneous at bedtime  isosorbide   mononitrate ER Tablet (IMDUR) 30 milliGRAM(s) Oral daily  losartan 100 milliGRAM(s) Oral daily  metoprolol tartrate 50 milliGRAM(s) Oral two times a day  NIFEdipine XL 60 milliGRAM(s) Oral daily  pantoprazole    Tablet 40 milliGRAM(s) Oral before breakfast  rivaroxaban 20 milliGRAM(s) Oral with dinner  tamsulosin 0.4 milliGRAM(s) Oral at bedtime      Allergies:  contrast media (gadolinium-based) (Other)  lisinopril (Other)    FAMILY HISTORY:Non-contributary for premature coronary disease or sudden cardiac death    SOCIAL HISTORY:    [X ] Non-smoker  [ ] Smoker  [ ] Alcohol    FLU VACCINE THIS YEAR STARTS IN AUGUST:  [ ] Yes    [ ] No    IF OVER 65 HAVE YOU EVER HAD A PNA VACCINE:  [ ] Yes    [ ] No       [ ] N/A      REVIEW OF SYSTEMS:  [ ]chest pain  [  ]shortness of breath  [  ]palpitations  [  ]syncope  [ ]near syncope [ ]upper extremity weakness   [ ] lower extremity weakness  [  ]diplopia  [  ]altered mental status   [  ]fevers  [ ]chills [ ]nausea  [ ]vomiting  [  ]dysphagia    [ ]abdominal pain  [ ]melena  [ ]BRBPR    [  ]epistaxis  [  ]rash    [ ]lower extremity edema    [X] All others negative	  [ ] Unable to obtain      LABS:	 	    CARDIAC MARKERS:  CARDIAC MARKERS ( 29 Dec 2023 20:30 )  x     / x     / 97 U/L / x     / 3.8 ng/mL                              12.1   5.66  )-----------( SEE NOTE    ( 30 Dec 2023 05:40 )             38.2     Hb Trend: 12.1<--, 13.0<--    12-30    141  |  106  |  16  ----------------------------<  71  3.5   |  29  |  1.00    Ca    8.6      30 Dec 2023 05:40  Phos  3.4     12-30  Mg     1.7     12-30    TPro  7.8  /  Alb  3.4<L>  /  TBili  0.4  /  DBili  x   /  AST  26  /  ALT  17  /  AlkPhos  83  12-30    Creatinine Trend: 1.00<--, 1.02<--    TSH: Thyroid Stimulating Hormone, Serum: 2.41 uU/mL (12-29 @ 12:45)          PHYSICAL EXAM:  T(C): 36.4 (12-30-23 @ 11:25), Max: 37.1 (12-29-23 @ 20:30)  HR: 84 (12-30-23 @ 11:25) (81 - 118)  BP: 157/95 (12-30-23 @ 11:25) (151/82 - 171/95)  RR: 18 (12-30-23 @ 11:25) (18 - 18)  SpO2: 97% (12-30-23 @ 11:25) (97% - 100%)  Wt(kg): --   BMI (kg/m2): 40.5 (12-29-23 @ 11:58)  I&O's Summary      Gen: NAD  HEENT:  (-)icterus (-)pallor  CV: N S1 S2 1/6 ALISSON (+)2 Pulses B/l  Resp:  Clear to auscultation B/L, normal effort  GI: (+) BS Soft, NT, ND  Lymph:  (-)Edema, (-)obvious lymphadenopathy  Skin: Warm to touch, Normal turgor  Psych: Appropriate mood and affect      ECG:  	NSR, LVH, no ischemic changes    RADIOLOGY:         CXR: < from: Xray Chest 1 View- PORTABLE-Urgent (12.29.23 @ 14:16) >  IMPRESSION: Negative chest.    < end of copied text >      ASSESSMENT/PLAN: Pt is a 73 yrs old M, pmhx of  CAD s/p PCI x3  in 04/2018 at [facility] but on Xarelto, HTN, DM, HLD, Asthma, CVA in 2006 with residual rleft sided weaknesspresented with chest pressure.    1. Chest Pain/SVT  - per records 180 upon admission now HR in 80s nmo more chest pain  - suspect symptoms and troponin increase due to demand, CK negative  - recent stress without ischemia  - c/w ASA Statin  - c/w Statin  - c/w imdur and Nifidipine  - Increase BB to 100 mg BID  - c/w Losartan    Has follow up with his Cardiologist next week on January 2      Sara Mullen MD  Pager: [phone]    C A R D I O L O G Y  *********************    DATE OF SERVICE: 12-30-23    HISTORY OF PRESENT ILLNESS: HPI:  Pt is a 73 yrs old M, pmhx of  CAD s/p PCI x3  in 04/2018 at [facility] but on Xarelto, HTN, DM, HLD, Asthma, CVA in 2006 with residual rleft sided weaknesspresented with chest pressure.     Pt stated that chest pressure was central without any radiation, with palpitation that lasted about 30 minutes and was resolved by the time pt was at the hospital. Denied sweating, dizziness, blurring of vision, dyspnea, orthopnea, abdominal pain, N/V/D, dysuria, weakness or decreased sensation in the extremities. He has been having chronic LE swelling which he attributes to chronic venous insufficiency, which he usually either wraps or uses stockings. He stated that he had similar chest pain in the past on and off/ Had recent TTE in October 26 for similar symptoms, stress showed no evidence of ischemia, and  fixed small to medium sized apical to basal inferior perfusion defect consistent with scar. EF on stress was 51% with inferior wall infarct without lester-infarct ishemia and inferior wall hypokinesis also had a TTE which showed EF of 55-60% with apical cap akinetic. As a result he was started on Imdur. Previosuly had an allergic reaction to contrast during PCI which was hives. Current MEds include ASA 81, Lipitor 80, Xarelto 20 mg, Metoprolol 100 mg Losartan 100 mg, Nifidipine 60 mg, HCTZ 25 mg      PAST MEDICAL & SURGICAL HISTORY:  CAD (coronary artery disease)  Atrial fibrillation  HTN (hypertension)  Diabetes  No significant past surgical history      MEDICATIONS:  MEDICATIONS  (STANDING):  aspirin enteric coated 81 milliGRAM(s) Oral daily  atorvastatin 80 milliGRAM(s) Oral at bedtime  desmopressin 0.2 milliGRAM(s) Oral daily  finasteride 5 milliGRAM(s) Oral daily  hydrochlorothiazide 25 milliGRAM(s) Oral daily  insulin glargine Injectable (LANTUS) 20 Unit(s) SubCutaneous at bedtime  insulin lispro (ADMELOG) corrective regimen sliding scale   SubCutaneous three times a day before meals  insulin lispro (ADMELOG) corrective regimen sliding scale   SubCutaneous at bedtime  isosorbide   mononitrate ER Tablet (IMDUR) 30 milliGRAM(s) Oral daily  losartan 100 milliGRAM(s) Oral daily  metoprolol tartrate 50 milliGRAM(s) Oral two times a day  NIFEdipine XL 60 milliGRAM(s) Oral daily  pantoprazole    Tablet 40 milliGRAM(s) Oral before breakfast  rivaroxaban 20 milliGRAM(s) Oral with dinner  tamsulosin 0.4 milliGRAM(s) Oral at bedtime      Allergies:  contrast media (gadolinium-based) (Other)  lisinopril (Other)    FAMILY HISTORY:Non-contributary for premature coronary disease or sudden cardiac death    SOCIAL HISTORY:    [X ] Non-smoker  [ ] Smoker  [ ] Alcohol    FLU VACCINE THIS YEAR STARTS IN AUGUST:  [ ] Yes    [ ] No    IF OVER 65 HAVE YOU EVER HAD A PNA VACCINE:  [ ] Yes    [ ] No       [ ] N/A      REVIEW OF SYSTEMS:  [ ]chest pain  [  ]shortness of breath  [  ]palpitations  [  ]syncope  [ ]near syncope [ ]upper extremity weakness   [ ] lower extremity weakness  [  ]diplopia  [  ]altered mental status   [  ]fevers  [ ]chills [ ]nausea  [ ]vomiting  [  ]dysphagia    [ ]abdominal pain  [ ]melena  [ ]BRBPR    [  ]epistaxis  [  ]rash    [ ]lower extremity edema    [X] All others negative	  [ ] Unable to obtain      LABS:	 	    CARDIAC MARKERS:  CARDIAC MARKERS ( 29 Dec 2023 20:30 )  x     / x     / 97 U/L / x     / 3.8 ng/mL                              12.1   5.66  )-----------( SEE NOTE    ( 30 Dec 2023 05:40 )             38.2     Hb Trend: 12.1<--, 13.0<--    12-30    141  |  106  |  16  ----------------------------<  71  3.5   |  29  |  1.00    Ca    8.6      30 Dec 2023 05:40  Phos  3.4     12-30  Mg     1.7     12-30    TPro  7.8  /  Alb  3.4<L>  /  TBili  0.4  /  DBili  x   /  AST  26  /  ALT  17  /  AlkPhos  83  12-30    Creatinine Trend: 1.00<--, 1.02<--    TSH: Thyroid Stimulating Hormone, Serum: 2.41 uU/mL (12-29 @ 12:45)          PHYSICAL EXAM:  T(C): 36.4 (12-30-23 @ 11:25), Max: 37.1 (12-29-23 @ 20:30)  HR: 84 (12-30-23 @ 11:25) (81 - 118)  BP: 157/95 (12-30-23 @ 11:25) (151/82 - 171/95)  RR: 18 (12-30-23 @ 11:25) (18 - 18)  SpO2: 97% (12-30-23 @ 11:25) (97% - 100%)  Wt(kg): --   BMI (kg/m2): 40.5 (12-29-23 @ 11:58)  I&O's Summary      Gen: NAD  HEENT:  (-)icterus (-)pallor  CV: N S1 S2 1/6 ALISSON (+)2 Pulses B/l  Resp:  Clear to auscultation B/L, normal effort  GI: (+) BS Soft, NT, ND  Lymph:  (-)Edema, (-)obvious lymphadenopathy  Skin: Warm to touch, Normal turgor  Psych: Appropriate mood and affect      ECG:  	NSR, LVH, no ischemic changes    RADIOLOGY:         CXR: < from: Xray Chest 1 View- PORTABLE-Urgent (12.29.23 @ 14:16) >  IMPRESSION: Negative chest.    < end of copied text >      ASSESSMENT/PLAN: Pt is a 73 yrs old M, pmhx of  CAD s/p PCI x3  in 04/2018 at [facility] but on Xarelto, HTN, DM, HLD, Asthma, CVA in 2006 with residual rleft sided weaknesspresented with chest pressure.    1. Chest Pain/SVT  - per records 180 upon admission now HR in 80s nmo more chest pain  - suspect symptoms and troponin increase due to demand, CK negative  - recent stress without ischemia  - c/w ASA Statin  - c/w Statin  - c/w imdur and Nifidipine  - Increase BB to 100 mg BID  - c/w Losartan    Has follow up with his Cardiologist next week on January 2      Sara Mullen MD  Pager: [phone]

## 2024-01-01 LAB
HCV RNA FLD QL NAA+PROBE: SIGNIFICANT CHANGE UP
HCV RNA FLD QL NAA+PROBE: SIGNIFICANT CHANGE UP
HCV RNA SPEC QL PROBE+SIG AMP: SIGNIFICANT CHANGE UP
HCV RNA SPEC QL PROBE+SIG AMP: SIGNIFICANT CHANGE UP

## 2024-01-02 NOTE — ED POST DISCHARGE NOTE - ADDITIONAL DOCUMENTATION
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  Admitting team(Dr. Byrd, Dr. Contreras)  for patient was contacted to be informed of results.

## 2024-02-26 RX ORDER — ISOSORBIDE MONONITRATE 60 MG/1
1 TABLET, EXTENDED RELEASE ORAL
Refills: 0 | DISCHARGE

## 2024-02-26 RX ORDER — INSULIN GLARGINE 100 [IU]/ML
40 INJECTION, SOLUTION SUBCUTANEOUS
Refills: 0 | DISCHARGE

## 2024-02-26 RX ORDER — DESMOPRESSIN ACETATE 0.1 MG/1
1 TABLET ORAL
Refills: 0 | DISCHARGE

## 2024-02-26 RX ORDER — FINASTERIDE 5 MG/1
1 TABLET, FILM COATED ORAL
Refills: 0 | DISCHARGE

## 2024-02-26 RX ORDER — ATORVASTATIN CALCIUM 80 MG/1
1 TABLET, FILM COATED ORAL
Refills: 0 | DISCHARGE

## 2024-02-26 RX ORDER — TAMSULOSIN HYDROCHLORIDE 0.4 MG/1
1 CAPSULE ORAL
Refills: 0 | DISCHARGE

## 2024-02-26 RX ORDER — NIFEDIPINE 30 MG
1 TABLET, EXTENDED RELEASE 24 HR ORAL
Refills: 0 | DISCHARGE

## 2024-02-26 RX ORDER — CYCLOBENZAPRINE HYDROCHLORIDE 10 MG/1
2 TABLET, FILM COATED ORAL
Refills: 0 | DISCHARGE

## 2024-02-26 RX ORDER — DEXLANSOPRAZOLE 30 MG/1
1 CAPSULE, DELAYED RELEASE ORAL
Refills: 0 | DISCHARGE

## 2024-02-26 RX ORDER — ERGOCALCIFEROL 1.25 MG/1
1 CAPSULE ORAL
Refills: 0 | DISCHARGE

## 2024-02-26 RX ORDER — HYDROCHLOROTHIAZIDE 25 MG
1 TABLET ORAL
Refills: 0 | DISCHARGE

## 2024-02-26 RX ORDER — CELECOXIB 200 MG/1
1 CAPSULE ORAL
Refills: 0 | DISCHARGE

## 2024-02-26 RX ORDER — METOPROLOL TARTRATE 50 MG
1 TABLET ORAL
Refills: 0 | DISCHARGE

## 2024-02-26 RX ORDER — METFORMIN HYDROCHLORIDE 850 MG/1
1 TABLET ORAL
Refills: 0 | DISCHARGE

## 2024-02-26 RX ORDER — LOSARTAN POTASSIUM 100 MG/1
1 TABLET, FILM COATED ORAL
Refills: 0 | DISCHARGE

## 2024-02-26 RX ORDER — ASPIRIN/CALCIUM CARB/MAGNESIUM 324 MG
1 TABLET ORAL
Refills: 0 | DISCHARGE

## 2024-02-26 RX ORDER — ALBUTEROL 90 UG/1
2 AEROSOL, METERED ORAL
Refills: 0 | DISCHARGE

## 2024-02-26 RX ORDER — RIVAROXABAN 15 MG-20MG
1 KIT ORAL
Refills: 0 | DISCHARGE

## 2024-02-26 RX ORDER — SEMAGLUTIDE 0.68 MG/ML
2 INJECTION, SOLUTION SUBCUTANEOUS
Refills: 0 | DISCHARGE

## 2025-03-04 PROBLEM — I48.91 UNSPECIFIED ATRIAL FIBRILLATION: Chronic | Status: ACTIVE | Noted: 2023-12-29

## 2025-03-04 PROBLEM — E11.9 TYPE 2 DIABETES MELLITUS WITHOUT COMPLICATIONS: Chronic | Status: ACTIVE | Noted: 2023-12-29

## 2025-03-04 PROBLEM — I10 ESSENTIAL (PRIMARY) HYPERTENSION: Chronic | Status: ACTIVE | Noted: 2023-12-29

## 2025-03-04 PROBLEM — I25.10 ATHEROSCLEROTIC HEART DISEASE OF NATIVE CORONARY ARTERY WITHOUT ANGINA PECTORIS: Chronic | Status: ACTIVE | Noted: 2023-12-29

## 2025-05-21 ENCOUNTER — EMERGENCY (EMERGENCY)
Facility: HOSPITAL | Age: 75
LOS: 1 days | End: 2025-05-21
Attending: EMERGENCY MEDICINE
Payer: COMMERCIAL

## 2025-05-21 VITALS
HEART RATE: 97 BPM | SYSTOLIC BLOOD PRESSURE: 138 MMHG | TEMPERATURE: 98 F | OXYGEN SATURATION: 100 % | DIASTOLIC BLOOD PRESSURE: 78 MMHG | RESPIRATION RATE: 18 BRPM

## 2025-05-21 VITALS
RESPIRATION RATE: 25 BRPM | SYSTOLIC BLOOD PRESSURE: 114 MMHG | DIASTOLIC BLOOD PRESSURE: 65 MMHG | HEART RATE: 98 BPM | OXYGEN SATURATION: 98 % | WEIGHT: 40.57 LBS

## 2025-05-21 DIAGNOSIS — Z95.5 PRESENCE OF CORONARY ANGIOPLASTY IMPLANT AND GRAFT: Chronic | ICD-10-CM

## 2025-05-21 DIAGNOSIS — Z90.3 ACQUIRED ABSENCE OF STOMACH [PART OF]: Chronic | ICD-10-CM

## 2025-05-21 LAB
ALBUMIN SERPL ELPH-MCNC: 3 G/DL — LOW (ref 3.5–5)
ALP SERPL-CCNC: 98 U/L — SIGNIFICANT CHANGE UP (ref 40–120)
ALT FLD-CCNC: 14 U/L DA — SIGNIFICANT CHANGE UP (ref 10–60)
ANION GAP SERPL CALC-SCNC: 10 MMOL/L — SIGNIFICANT CHANGE UP (ref 5–17)
APPEARANCE UR: CLEAR — SIGNIFICANT CHANGE UP
AST SERPL-CCNC: 14 U/L — SIGNIFICANT CHANGE UP (ref 10–40)
BASE EXCESS BLDA CALC-SCNC: 3.5 MMOL/L — HIGH (ref -2–3)
BASOPHILS # BLD AUTO: 0.06 K/UL — SIGNIFICANT CHANGE UP (ref 0–0.2)
BASOPHILS NFR BLD AUTO: 1 % — SIGNIFICANT CHANGE UP (ref 0–2)
BILIRUB SERPL-MCNC: 0.4 MG/DL — SIGNIFICANT CHANGE UP (ref 0.2–1.2)
BILIRUB UR-MCNC: NEGATIVE — SIGNIFICANT CHANGE UP
BLOOD GAS COMMENTS ARTERIAL: SIGNIFICANT CHANGE UP
BUN SERPL-MCNC: 21 MG/DL — HIGH (ref 7–18)
CALCIUM SERPL-MCNC: 9 MG/DL — SIGNIFICANT CHANGE UP (ref 8.4–10.5)
CHLORIDE SERPL-SCNC: 96 MMOL/L — SIGNIFICANT CHANGE UP (ref 96–108)
CO2 SERPL-SCNC: 24 MMOL/L — SIGNIFICANT CHANGE UP (ref 22–31)
COLOR SPEC: YELLOW — SIGNIFICANT CHANGE UP
CREAT SERPL-MCNC: 1.97 MG/DL — HIGH (ref 0.5–1.3)
DIFF PNL FLD: NEGATIVE — SIGNIFICANT CHANGE UP
EGFR: 35 ML/MIN/1.73M2 — LOW
EGFR: 35 ML/MIN/1.73M2 — LOW
EOSINOPHIL # BLD AUTO: 0.13 K/UL — SIGNIFICANT CHANGE UP (ref 0–0.5)
EOSINOPHIL NFR BLD AUTO: 2 % — SIGNIFICANT CHANGE UP (ref 0–6)
FLUAV AG NPH QL: SIGNIFICANT CHANGE UP
FLUBV AG NPH QL: SIGNIFICANT CHANGE UP
GLUCOSE SERPL-MCNC: 220 MG/DL — HIGH (ref 70–99)
GLUCOSE UR QL: NEGATIVE MG/DL — SIGNIFICANT CHANGE UP
HCO3 BLDA-SCNC: 26 MMOL/L — SIGNIFICANT CHANGE UP (ref 21–28)
HCT VFR BLD CALC: 33.4 % — LOW (ref 39–50)
HGB BLD-MCNC: 10.9 G/DL — LOW (ref 13–17)
HOROWITZ INDEX BLDA+IHG-RTO: 60 — SIGNIFICANT CHANGE UP
KETONES UR QL: ABNORMAL MG/DL
LACTATE SERPL-SCNC: 3.8 MMOL/L — HIGH (ref 0.7–2)
LEUKOCYTE ESTERASE UR-ACNC: NEGATIVE — SIGNIFICANT CHANGE UP
LYMPHOCYTES # BLD AUTO: 1.5 K/UL — SIGNIFICANT CHANGE UP (ref 1–3.3)
LYMPHOCYTES # BLD AUTO: 24 % — SIGNIFICANT CHANGE UP (ref 13–44)
MANUAL SMEAR VERIFICATION: SIGNIFICANT CHANGE UP
MCHC RBC-ENTMCNC: 30.2 PG — SIGNIFICANT CHANGE UP (ref 27–34)
MCHC RBC-ENTMCNC: 32.6 G/DL — SIGNIFICANT CHANGE UP (ref 32–36)
MCV RBC AUTO: 92.5 FL — SIGNIFICANT CHANGE UP (ref 80–100)
MONOCYTES # BLD AUTO: 0.56 K/UL — SIGNIFICANT CHANGE UP (ref 0–0.9)
MONOCYTES NFR BLD AUTO: 9 % — SIGNIFICANT CHANGE UP (ref 2–14)
NEUTROPHILS # BLD AUTO: 3.64 K/UL — SIGNIFICANT CHANGE UP (ref 1.8–7.4)
NEUTROPHILS NFR BLD AUTO: 57 % — SIGNIFICANT CHANGE UP (ref 43–77)
NEUTS BAND # BLD: 1 % — SIGNIFICANT CHANGE UP (ref 0–8)
NEUTS BAND NFR BLD: 1 % — SIGNIFICANT CHANGE UP (ref 0–8)
NITRITE UR-MCNC: NEGATIVE — SIGNIFICANT CHANGE UP
NRBC # BLD: 0 /100 WBCS — SIGNIFICANT CHANGE UP (ref 0–0)
NRBC BLD-RTO: 0 /100 WBCS — SIGNIFICANT CHANGE UP (ref 0–0)
PCO2 BLDA: 30 MMHG — LOW (ref 35–48)
PH BLDA: 7.54 — HIGH (ref 7.35–7.45)
PH UR: 5.5 — SIGNIFICANT CHANGE UP (ref 5–8)
PLAT MORPH BLD: NORMAL — SIGNIFICANT CHANGE UP
PLATELET # BLD AUTO: 203 K/UL — SIGNIFICANT CHANGE UP (ref 150–400)
PLATELET CLUMP BLD QL SMEAR: SLIGHT
PLATELET COUNT - ESTIMATE: NORMAL — SIGNIFICANT CHANGE UP
PO2 BLDA: 330 MMHG — HIGH (ref 83–108)
POTASSIUM SERPL-MCNC: 3.2 MMOL/L — LOW (ref 3.5–5.3)
POTASSIUM SERPL-SCNC: 3.2 MMOL/L — LOW (ref 3.5–5.3)
PROT SERPL-MCNC: 7.9 G/DL — SIGNIFICANT CHANGE UP (ref 6–8.3)
PROT UR-MCNC: NEGATIVE MG/DL — SIGNIFICANT CHANGE UP
RBC # BLD: 3.61 M/UL — LOW (ref 4.2–5.8)
RBC # FLD: 13.5 % — SIGNIFICANT CHANGE UP (ref 10.3–14.5)
RBC BLD AUTO: NORMAL — SIGNIFICANT CHANGE UP
RSV RNA NPH QL NAA+NON-PROBE: SIGNIFICANT CHANGE UP
SAO2 % BLDA: 99 % — SIGNIFICANT CHANGE UP
SARS-COV-2 RNA SPEC QL NAA+PROBE: SIGNIFICANT CHANGE UP
SODIUM SERPL-SCNC: 130 MMOL/L — LOW (ref 135–145)
SOURCE RESPIRATORY: SIGNIFICANT CHANGE UP
SP GR SPEC: 1.01 — SIGNIFICANT CHANGE UP (ref 1–1.03)
TROPONIN I, HIGH SENSITIVITY RESULT: 13.8 NG/L — SIGNIFICANT CHANGE UP
UROBILINOGEN FLD QL: 1 MG/DL — SIGNIFICANT CHANGE UP (ref 0.2–1)
VARIANT LYMPHS # BLD: 6 % — SIGNIFICANT CHANGE UP (ref 0–6)
VARIANT LYMPHS NFR BLD MANUAL: 6 % — SIGNIFICANT CHANGE UP (ref 0–6)
WBC # BLD: 6.27 K/UL — SIGNIFICANT CHANGE UP (ref 3.8–10.5)
WBC # FLD AUTO: 6.27 K/UL — SIGNIFICANT CHANGE UP (ref 3.8–10.5)

## 2025-05-21 PROCEDURE — 99284 EMERGENCY DEPT VISIT MOD MDM: CPT | Mod: 25

## 2025-05-21 PROCEDURE — 71045 X-RAY EXAM CHEST 1 VIEW: CPT

## 2025-05-21 PROCEDURE — 82962 GLUCOSE BLOOD TEST: CPT

## 2025-05-21 PROCEDURE — 84484 ASSAY OF TROPONIN QUANT: CPT

## 2025-05-21 PROCEDURE — 36415 COLL VENOUS BLD VENIPUNCTURE: CPT

## 2025-05-21 PROCEDURE — 85025 COMPLETE CBC W/AUTO DIFF WBC: CPT

## 2025-05-21 PROCEDURE — 82803 BLOOD GASES ANY COMBINATION: CPT

## 2025-05-21 PROCEDURE — 87637 SARSCOV2&INF A&B&RSV AMP PRB: CPT

## 2025-05-21 PROCEDURE — 96374 THER/PROPH/DIAG INJ IV PUSH: CPT

## 2025-05-21 PROCEDURE — 93005 ELECTROCARDIOGRAM TRACING: CPT

## 2025-05-21 PROCEDURE — 83605 ASSAY OF LACTIC ACID: CPT

## 2025-05-21 PROCEDURE — 94660 CPAP INITIATION&MGMT: CPT

## 2025-05-21 PROCEDURE — 0241U: CPT

## 2025-05-21 PROCEDURE — 71045 X-RAY EXAM CHEST 1 VIEW: CPT | Mod: 26

## 2025-05-21 PROCEDURE — 94640 AIRWAY INHALATION TREATMENT: CPT

## 2025-05-21 PROCEDURE — 87040 BLOOD CULTURE FOR BACTERIA: CPT

## 2025-05-21 PROCEDURE — 80053 COMPREHEN METABOLIC PANEL: CPT

## 2025-05-21 PROCEDURE — 81003 URINALYSIS AUTO W/O SCOPE: CPT

## 2025-05-21 PROCEDURE — 99291 CRITICAL CARE FIRST HOUR: CPT

## 2025-05-21 RX ORDER — IPRATROPIUM BROMIDE AND ALBUTEROL SULFATE .5; 2.5 MG/3ML; MG/3ML
3 SOLUTION RESPIRATORY (INHALATION) ONCE
Refills: 0 | Status: COMPLETED | OUTPATIENT
Start: 2025-05-21 | End: 2025-05-21

## 2025-05-21 RX ORDER — FUROSEMIDE 10 MG/ML
40 INJECTION INTRAMUSCULAR; INTRAVENOUS ONCE
Refills: 0 | Status: COMPLETED | OUTPATIENT
Start: 2025-05-21 | End: 2025-05-21

## 2025-05-21 RX ADMIN — FUROSEMIDE 40 MILLIGRAM(S): 10 INJECTION INTRAMUSCULAR; INTRAVENOUS at 14:01

## 2025-05-21 RX ADMIN — IPRATROPIUM BROMIDE AND ALBUTEROL SULFATE 3 MILLILITER(S): .5; 2.5 SOLUTION RESPIRATORY (INHALATION) at 14:01

## 2025-05-26 LAB
CULTURE RESULTS: SIGNIFICANT CHANGE UP
CULTURE RESULTS: SIGNIFICANT CHANGE UP
SPECIMEN SOURCE: SIGNIFICANT CHANGE UP
SPECIMEN SOURCE: SIGNIFICANT CHANGE UP